# Patient Record
Sex: MALE | Race: BLACK OR AFRICAN AMERICAN | Employment: OTHER | RURAL
[De-identification: names, ages, dates, MRNs, and addresses within clinical notes are randomized per-mention and may not be internally consistent; named-entity substitution may affect disease eponyms.]

---

## 2017-01-30 ENCOUNTER — OFFICE VISIT (OUTPATIENT)
Dept: INTERNAL MEDICINE CLINIC | Age: 72
End: 2017-01-30

## 2017-01-30 VITALS
WEIGHT: 234 LBS | SYSTOLIC BLOOD PRESSURE: 113 MMHG | DIASTOLIC BLOOD PRESSURE: 63 MMHG | RESPIRATION RATE: 18 BRPM | OXYGEN SATURATION: 98 % | BODY MASS INDEX: 37.61 KG/M2 | HEART RATE: 60 BPM | HEIGHT: 66 IN | TEMPERATURE: 95.6 F

## 2017-01-30 DIAGNOSIS — C61 PROSTATE CANCER (HCC): ICD-10-CM

## 2017-01-30 DIAGNOSIS — M10.00 IDIOPATHIC GOUT, UNSPECIFIED CHRONICITY, UNSPECIFIED SITE: ICD-10-CM

## 2017-01-30 DIAGNOSIS — I10 ESSENTIAL HYPERTENSION: ICD-10-CM

## 2017-01-30 DIAGNOSIS — E11.65 TYPE 2 DIABETES MELLITUS WITH HYPERGLYCEMIA, WITHOUT LONG-TERM CURRENT USE OF INSULIN (HCC): Primary | ICD-10-CM

## 2017-01-30 RX ORDER — METFORMIN HYDROCHLORIDE 500 MG/1
TABLET ORAL
Qty: 180 TAB | Refills: 3 | Status: SHIPPED | OUTPATIENT
Start: 2017-01-30 | End: 2018-01-16 | Stop reason: SDUPTHER

## 2017-01-30 RX ORDER — HYDROCHLOROTHIAZIDE 25 MG/1
25 TABLET ORAL DAILY
Qty: 90 TAB | Refills: 3 | Status: SHIPPED | OUTPATIENT
Start: 2017-01-30 | End: 2018-01-16 | Stop reason: SDUPTHER

## 2017-01-30 RX ORDER — LISINOPRIL 10 MG/1
10 TABLET ORAL DAILY
Qty: 30 TAB | Refills: 5 | Status: SHIPPED | OUTPATIENT
Start: 2017-01-30 | End: 2018-04-18 | Stop reason: ALTCHOICE

## 2017-01-30 NOTE — MR AVS SNAPSHOT
Visit Information Date & Time Provider Department Dept. Phone Encounter #  
 1/30/2017  8:00 AM Jayro Navas MD [facility] [phone] Follow-up Instructions Return in about 3 months (around 4/30/2017) for routine follow up. Upcoming Health Maintenance Date Due MICROALBUMIN Q1 9/29/2015 EYE EXAM RETINAL OR DILATED Q1 12/1/2015 GLAUCOMA SCREENING Q2Y 12/1/2016 HEMOGLOBIN A1C Q6M 4/3/2017 FOOT EXAM Q1 6/14/2017 MEDICARE YEARLY EXAM 10/1/2017 LIPID PANEL Q1 10/3/2017 COLONOSCOPY 1/9/2025 DTaP/Tdap/Td series (2 - Td) 9/30/2026 Allergies as of 1/30/2017  Review Complete On: 1/30/2017 By: Jayro Navas MD  
  
 Severity Noted Reaction Type Reactions Lipitor [Atorvastatin]  10/23/2013    Itching Current Immunizations  Reviewed on 10/23/2013 Name Date Influenza High Dose Vaccine PF 9/30/2016 Influenza Vaccine 10/23/2013 Influenza Vaccine Ti [place]) 9/29/2014 Influenza Vaccine (Quad) PF 10/28/2015 Pneumococcal Conjugate (PCV-13) 9/30/2016 Pneumococcal Polysaccharide (PPSV-23) 9/29/2014 Not reviewed this visit You Were Diagnosed With   
  
 Codes Comments Type 2 diabetes mellitus with hyperglycemia, without long-term current use of insulin (HCC)    -  Primary ICD-10-CM: E11.65 ICD-9-CM: 250.00, 790.29 Essential hypertension     ICD-10-CM: I10 
ICD-9-CM: 401.9 Prostate cancer [facility])     ICD-10-CM: Z82 ICD-9-CM: 218 Idiopathic gout, unspecified chronicity, unspecified site     ICD-10-CM: M10.00 ICD-9-CM: 274.9 Vitals BP Pulse Temp Resp Height(growth percentile) Weight(growth percentile) 113/63 (BP 1 Location: Left arm, BP Patient Position: Sitting) 60 95.6 °F (35.3 °C) (Oral) 18 5' 6\" (1.676 m) 234 lb (106.1 kg) SpO2 BMI Smoking Status 98% 37.77 kg/m2 Never Smoker BMI and BSA Data  Body Mass Index Body Surface Area  
 37.77 kg/m 2 2.22 m 2  
  
  
 Preferred Pharmacy Pharmacy Name Phone [facility] [phone] Your Updated Medication List  
  
   
This list is accurate as of: 1/30/17  8:41 AM.  Always use your most recent med list.  
  
  
  
  
 colchicine 0.6 mg tablet Commonly known as:  COLCRYS Take 1 Tab by mouth daily. Indications: GOUT  
  
 hydroCHLOROthiazide 25 mg tablet Commonly known as:  HYDRODIURIL Take 1 Tab by mouth daily. lisinopril 10 mg tablet Commonly known as:  Jaida Primmer Take 1 Tab by mouth daily. metFORMIN 500 mg tablet Commonly known as:  GLUCOPHAGE  
TAKE 1 TABLET BY MOUTH TWO (2) TIMES DAILY (WITH MEALS). metoprolol succinate 100 mg tablet Commonly known as:  TOPROL-XL Take 1 Tab by mouth daily. naproxen 500 mg tablet Commonly known as:  NAPROSYN  
TAKE 1 TABLET BY MOUTH TWO (2) TIMES DAILY (WITH MEALS). AS NEEDED FOR GOUT  
  
 red yeast rice extract 600 mg Cap Take 600 mg by mouth now. Prescriptions Sent to Pharmacy Refills  
 hydroCHLOROthiazide (HYDRODIURIL) 25 mg tablet 3 Sig: Take 1 Tab by mouth daily. Class: Normal  
 Pharmacy: [address] Ph #: [phone] Route: Oral  
 metFORMIN (GLUCOPHAGE) 500 mg tablet 3 Sig: TAKE 1 TABLET BY MOUTH TWO (2) TIMES DAILY (WITH MEALS). Class: Normal  
 Pharmacy: [address] Ph #: [phone]  
 lisinopril (PRINIVIL, ZESTRIL) 10 mg tablet 5 Sig: Take 1 Tab by mouth daily. Class: Normal  
 Pharmacy: [address] Ph #: [phone] Route: Oral  
  
We Performed the Following HEMOGLOBIN A1C WITH EAG [15829 CPT(R)] METABOLIC PANEL, BASIC [13909 CPT(R)] MICROALBUMIN, UR, RAND W/ MICROALBUMIN/CREA RATIO U398199 CPT(R)] Follow-up Instructions Return in about 3 months (around 4/30/2017) for routine follow up. Patient Instructions Take ditropan every other day for 2 weeks before stopping. Introducing [facility] SERVICES! Shorty Christopher introduces [employer] patient portal. Now you can access parts of your medical record, email your doctor's office, and request medication refills online. 1. In your internet browser, go to [email or web address] 2. Click on the First Time User? Click Here link in the Sign In box. You will see the New Member Sign Up page. 3. Enter your [employer] Access Code exactly as it appears below. You will not need to use this code after youve completed the sign-up process. If you do not sign up before the expiration date, you must request a new code. · [employer] Access Code: [email or web address] Expires: 4/30/2017  8:14 AM 
 
4. Enter the last four digits of your Social Security Number (xxxx) and Date of Birth (mm/dd/yyyy) as indicated and click Submit. You will be taken to the next sign-up page. 5. Create a [employer] ID. This will be your [employer] login ID and cannot be changed, so think of one that is secure and easy to remember. 6. Create a [employer] password. You can change your password at any time. 7. Enter your Password Reset Question and Answer. This can be used at a later time if you forget your password. 8. Enter your e-mail address. You will receive e-mail notification when new information is available in [address] 9. Click Sign Up. You can now view and download portions of your medical record. 10. Click the Download Summary menu link to download a portable copy of your medical information. If you have questions, please visit the Frequently Asked Questions section of the [employer] website. Remember, [employer] is NOT to be used for urgent needs. For medical emergencies, dial 911. Now available from your iPhone and Android! Please provide this summary of care documentation to your next provider. Your primary care clinician is listed as Pilar Aparicio. If you have any questions after today's visit, please call [phone].

## 2017-01-30 NOTE — LETTER
2/1/2017 7:54 AM 
 
Mr. Ghada Pedroza [phone] Dear Ghada Maxwell: 
 
Please find your most recent results below. Resulted Orders MICROALBUMIN, UR, RAND W/ MICROALBUMIN/CREA RATIO Result Value Ref Range Creatinine, urine 123.8 Not Estab. mg/dL Microalbumin, urine 48.2 Not Estab. ug/mL Microalb/Creat ratio (ug/mg creat.) 38.9 (H) 0.0 - 30.0 mg/g creat Narrative Performed at:  [address]  [id number] : Mary Glasgow MD, Phone:  [phone] HEMOGLOBIN A1C WITH EAG Result Value Ref Range Hemoglobin A1c 7.4 (H) 4.8 - 5.6 % Comment:  
            Pre-diabetes: 5.7 - 6.4 Diabetes: >6.4 Glycemic control for adults with diabetes: <7.0 Estimated average glucose 166 mg/dL Narrative Performed at:  [address]  [id number] : Mary Glasgow MD, Phone:  [phone] METABOLIC PANEL, BASIC Result Value Ref Range Glucose 139 (H) 65 - 99 mg/dL BUN 17 8 - 27 mg/dL Creatinine 1.06 0.76 - 1.27 mg/dL GFR est non-AA 69 >59 mL/min/1.73 GFR est AA 80 >59 mL/min/1.73  
 BUN/Creatinine ratio 16 10 - 22 Sodium 142 134 - 144 mmol/L Potassium 3.9 3.5 - 5.2 mmol/L Chloride 99 96 - 106 mmol/L  
 CO2 28 18 - 29 mmol/L Calcium 8.9 8.6 - 10.2 mg/dL Narrative Performed at:  [address]  [id number] : Mary Glasgow MD, Phone:  [phone] RECOMMENDATIONS: 
Results of labs are normal/ stable. Follow up as scheduled. Please call me if you have any questions: [phone] Sincerely, Katie Heath MD

## 2017-01-30 NOTE — PROGRESS NOTES
Chief Complaint   Patient presents with    Hypertension     follow up     I have reviewed the patient's medical history in detail and updated the computerized patient record. Health Maintenance reviewed. 1. Have you been to the ER, urgent care clinic since your last visit? Hospitalized since your last visit?no    2. Have you seen or consulted any other health care providers outside of the [facility] since your last visit? Include any pap smears or colon screening.  no

## 2017-01-30 NOTE — PROGRESS NOTES
Subjective:     Jack Bradshaw is a 66 y.o. male seen for follow-up of diabetes. He has had hypoglycemic attacks. .no  Blood sugar control has been unknown  He has diabetes and hypertension. Saw , says prostate CA  in remission. He recommended cahnges in his prostate urine meds, noissues with urination  Captopril expensive. Not seen eye doc recently. Min c/o gout. Jack Bradshaw has the additional concern of wife has memory issues and they have some financial issues      Diabetic Review of Systems: no polyuria or polydipsia, no chest pain, dyspnea or TIA's, no numbness, tingling or pain in extremities. Current Outpatient Prescriptions   Medication Sig Dispense Refill    naproxen (NAPROSYN) 500 mg tablet TAKE 1 TABLET BY MOUTH TWO (2) TIMES DAILY (WITH MEALS). AS NEEDED FOR GOUT 60 Tab 5    captopril (CAPOTEN) 25 mg tablet TAKE 1 TABLET BY MOUTH TWO (2) TIMES A DAY FOR BLOOD PRESSURE 180 Tab 3    colchicine (COLCRYS) 0.6 mg tablet Take 1 Tab by mouth daily. Indications: GOUT 30 Tab 5    hydrochlorothiazide (HYDRODIURIL) 25 mg tablet Take 1 Tab by mouth daily. 30 Tab 5    metoprolol succinate (TOPROL-XL) 100 mg tablet Take 1 Tab by mouth daily. 30 Tab 9    metFORMIN (GLUCOPHAGE) 500 mg tablet TAKE 1 TABLET BY MOUTH TWO (2) TIMES DAILY (WITH MEALS). 60 Tab 11    red yeast rice extract 600 mg cap Take 600 mg by mouth now. Allergies   Allergen Reactions    Lipitor [Atorvastatin] Itching       Diet and Lifestyle: nonsmoker.     Patient Active Problem List    Diagnosis Date Noted    Type 2 diabetes mellitus with hyperglycemia, without long-term current use of insulin ([facility] 01/30/2017    Prostate cancer ([facility] 02/23/2016    Essential hypertension 05/28/2015    Gout 10/23/2013    Irritable bladder 10/23/2013     Social History   Substance Use Topics    Smoking status: Never Smoker    Smokeless tobacco: Current User      Comment: snuff    Alcohol use No      Comment: seldom Lab Results  Component Value Date/Time   WBC 5.1 07/07/2016 11:11 AM   HGB 11.0 07/07/2016 11:11 AM   HCT 35.6 07/07/2016 11:11 AM   PLATELET 861 71/12/9161 11:11 AM   MCV 83 07/07/2016 11:11 AM       Lab Results  Component Value Date/Time   Hemoglobin A1c 7.6 10/03/2016 12:36 PM   Hemoglobin A1c 7.2 06/14/2016 08:32 AM   Hemoglobin A1c 6.9 02/23/2016 09:10 AM   Glucose 131 10/03/2016 12:36 PM   Glucose (POC) 122 01/07/2015 08:53 AM   LDL, calculated 114 10/03/2016 12:36 PM   Creatinine 1.03 10/03/2016 12:36 PM      Lab Results  Component Value Date/Time   Cholesterol, total 210 10/03/2016 12:36 PM   HDL Cholesterol 61 10/03/2016 12:36 PM   LDL, calculated 114 10/03/2016 12:36 PM   Triglyceride 175 10/03/2016 12:36 PM       Lab Results  Component Value Date/Time   ALT 6 06/01/2015 09:55 AM   AST 11 06/01/2015 09:55 AM   Alk. phosphatase 49 06/01/2015 09:55 AM   Bilirubin, total 0.4 06/01/2015 09:55 AM       Lab Results  Component Value Date/Time   GFR est AA 84 10/03/2016 12:36 PM   GFR est non-AA 72 10/03/2016 12:36 PM   Creatinine 1.03 10/03/2016 12:36 PM   BUN 19 10/03/2016 12:36 PM   Sodium 141 10/03/2016 12:36 PM   Potassium 3.8 10/03/2016 12:36 PM   Chloride 98 10/03/2016 12:36 PM   CO2 27 10/03/2016 12:36 PM      Lab Results   Component Value Date/Time    Glucose 131 10/03/2016 12:36 PM    Glucose (POC) 122 01/07/2015 08:53 AM         Review of Systems  Pertinent items are noted in HPI. Objective:     Significant for the followingWNL    Visit Vitals    /63 (BP 1 Location: Left arm, BP Patient Position: Sitting)    Pulse 60    Temp 95.6 °F (35.3 °C) (Oral)    Resp 18    Ht 5' 6\" (1.676 m)    Wt 234 lb (106.1 kg)    SpO2 98%    BMI 37.77 kg/m2     Appearance: alert, well appearing, and in no distress.   Exam: heart sounds normal rate and regular rhythm, S1 and S2 normal, systolic murmur 2/6 at lower left sternal border, chest clear  Foot exam: deferred    Lab review: labs reviewed, I note that glycosylated hemoglobin mildly abnormal but acceptable. Assessment/Plan:     Follow-up diabetes stable. Diabetic issues reviewed with him: all medications, side effects and compliance discussed carefully, annual eye examinations at Ophthalmology discussed and glycohemoglobin and other lab monitoring discussed. ICD-10-CM ICD-9-CM    1. Type 2 diabetes mellitus with hyperglycemia, without long-term current use of insulin ([facility]) E11.65 250.00 MICROALBUMIN, UR, RAND W/ MICROALBUMIN/CREA RATIO     790.29 HEMOGLOBIN A1C WITH EAG   2. Essential hypertension R21 288.9 METABOLIC PANEL, BASIC   3. [facility] ([facility] C61 185    4. Idiopathic gout, unspecified chronicity, unspecified site M10.00 274.9      Orders Placed This Encounter    MICROALBUMIN, UR, RAND W/ MICROALBUMIN/CREA RATIO    HEMOGLOBIN A1C WITH EAG    METABOLIC PANEL, BASIC    hydroCHLOROthiazide (HYDRODIURIL) 25 mg tablet     Sig: Take 1 Tab by mouth daily. Dispense:  90 Tab     Refill:  3    metFORMIN (GLUCOPHAGE) 500 mg tablet     Sig: TAKE 1 TABLET BY MOUTH TWO (2) TIMES DAILY (WITH MEALS). Dispense:  180 Tab     Refill:  3    lisinopril (PRINIVIL, ZESTRIL) 10 mg tablet     Sig: Take 1 Tab by mouth daily. Dispense:  30 Tab     Refill:  5       Follow-up Disposition:  Return in about 3 months (around 4/30/2017) for routine follow up.

## 2017-01-31 LAB
ALBUMIN/CREAT UR: 38.9 MG/G CREAT (ref 0–30)
BUN SERPL-MCNC: 17 MG/DL (ref 8–27)
BUN/CREAT SERPL: 16 (ref 10–22)
CALCIUM SERPL-MCNC: 8.9 MG/DL (ref 8.6–10.2)
CHLORIDE SERPL-SCNC: 99 MMOL/L (ref 96–106)
CO2 SERPL-SCNC: 28 MMOL/L (ref 18–29)
CREAT SERPL-MCNC: 1.06 MG/DL (ref 0.76–1.27)
CREAT UR-MCNC: 123.8 MG/DL
EST. AVERAGE GLUCOSE BLD GHB EST-MCNC: 166 MG/DL
GLUCOSE SERPL-MCNC: 139 MG/DL (ref 65–99)
HBA1C MFR BLD: 7.4 % (ref 4.8–5.6)
MICROALBUMIN UR-MCNC: 48.2 UG/ML
POTASSIUM SERPL-SCNC: 3.9 MMOL/L (ref 3.5–5.2)
SODIUM SERPL-SCNC: 142 MMOL/L (ref 134–144)

## 2017-06-15 ENCOUNTER — OFFICE VISIT (OUTPATIENT)
Dept: INTERNAL MEDICINE CLINIC | Age: 72
End: 2017-06-15

## 2017-06-15 VITALS
BODY MASS INDEX: 36.64 KG/M2 | TEMPERATURE: 95.8 F | WEIGHT: 228 LBS | OXYGEN SATURATION: 100 % | HEART RATE: 53 BPM | SYSTOLIC BLOOD PRESSURE: 126 MMHG | RESPIRATION RATE: 16 BRPM | HEIGHT: 66 IN | DIASTOLIC BLOOD PRESSURE: 70 MMHG

## 2017-06-15 DIAGNOSIS — E11.65 TYPE 2 DIABETES MELLITUS WITH HYPERGLYCEMIA, WITHOUT LONG-TERM CURRENT USE OF INSULIN (HCC): ICD-10-CM

## 2017-06-15 DIAGNOSIS — Z13.39 SCREENING FOR ALCOHOLISM: ICD-10-CM

## 2017-06-15 DIAGNOSIS — Z00.00 ROUTINE GENERAL MEDICAL EXAMINATION AT A HEALTH CARE FACILITY: Primary | ICD-10-CM

## 2017-06-15 DIAGNOSIS — L30.9 ECZEMA, UNSPECIFIED TYPE: ICD-10-CM

## 2017-06-15 DIAGNOSIS — D64.9 ANEMIA, UNSPECIFIED TYPE: ICD-10-CM

## 2017-06-15 DIAGNOSIS — Z23 ENCOUNTER FOR IMMUNIZATION: ICD-10-CM

## 2017-06-15 DIAGNOSIS — Z13.31 SCREENING FOR DEPRESSION: ICD-10-CM

## 2017-06-15 DIAGNOSIS — C61 PROSTATE CANCER (HCC): ICD-10-CM

## 2017-06-15 DIAGNOSIS — I10 ESSENTIAL HYPERTENSION: ICD-10-CM

## 2017-06-15 DIAGNOSIS — Z13.6 SCREENING FOR ISCHEMIC HEART DISEASE: ICD-10-CM

## 2017-06-15 RX ORDER — TRIAMCINOLONE ACETONIDE 1 MG/G
CREAM TOPICAL 2 TIMES DAILY
Qty: 30 G | Refills: 1 | Status: SHIPPED | OUTPATIENT
Start: 2017-06-15 | End: 2018-04-18 | Stop reason: ALTCHOICE

## 2017-06-15 RX ORDER — CAPTOPRIL 25 MG/1
25 TABLET ORAL 2 TIMES DAILY
COMMUNITY
End: 2017-10-16 | Stop reason: ALTCHOICE

## 2017-06-15 NOTE — LETTER
6/19/2017 9:34 AM 
 
Mr. Jen NUNEZ O [phone] Dear Jen Hou: 
 
Please find your most recent results below. Resulted Orders LIPID PANEL Result Value Ref Range Cholesterol, total 197 100 - 199 mg/dL Triglyceride 95 0 - 149 mg/dL HDL Cholesterol 50 >39 mg/dL VLDL, calculated 19 5 - 40 mg/dL LDL, calculated 128 (H) 0 - 99 mg/dL Narrative Performed at:  [address]  [id number] : Ben Wbeer MD, Phone:  [phone] METABOLIC PANEL, BASIC Result Value Ref Range Glucose 122 (H) 65 - 99 mg/dL BUN 16 8 - 27 mg/dL Creatinine 1.24 0.76 - 1.27 mg/dL GFR est non-AA 57 (L) >59 mL/min/1.73 GFR est AA 66 >59 mL/min/1.73  
 BUN/Creatinine ratio 13 10 - 24 Sodium 142 134 - 144 mmol/L Potassium 3.6 3.5 - 5.2 mmol/L Chloride 99 96 - 106 mmol/L  
 CO2 26 18 - 29 mmol/L Calcium 8.8 8.6 - 10.2 mg/dL Narrative Performed at:  [address]  [id number] : Ben Weber MD, Phone:  [phone] HEMOGLOBIN A1C WITH EAG Result Value Ref Range Hemoglobin A1c 7.7 (H) 4.8 - 5.6 % Comment:  
            Pre-diabetes: 5.7 - 6.4 Diabetes: >6.4 Glycemic control for adults with diabetes: <7.0 Estimated average glucose 174 mg/dL Narrative Performed at:  [address]  [id number] : Ben Weber MD, Phone:  [phone] CBC W/O DIFF Result Value Ref Range WBC 3.9 3.4 - 10.8 x10E3/uL  
 RBC 4.35 4.14 - 5.80 x10E6/uL HGB 11.4 (L) 12.6 - 17.7 g/dL HCT 35.1 (L) 37.5 - 51.0 % MCV 81 79 - 97 fL  
 MCH 26.2 (L) 26.6 - 33.0 pg  
 MCHC 32.5 31.5 - 35.7 g/dL  
 RDW 14.5 12.3 - 15.4 % PLATELET 475 733 - 412 x10E3/uL Narrative Performed at:  [address]  [id number] : Daniel Mancia MD, Phone:  [phone] CVD REPORT Result Value Ref Range INTERPRETATION NTAP   
 PDF IMAGE Not applicable Narrative Performed at:  [address] [place] [address]  [id number] : Santa Munson PhD, Phone:  [phone] CKD REPORT Result Value Ref Range Interpretation Note Comment:  
   Supplement report is available. Narrative Performed at:  [address] [place] [address]  [id number] : Santa Munson PhD, Phone:  [phone] DIABETES PATIENT EDUCATION Result Value Ref Range PDF Image Not applicable Narrative Performed at:  [address] [place] [address]  [id number] : Santa Munson PhD, Phone:  [phone] RECOMMENDATIONS: 
The results of your most recent labs are normal/ stable. Please follow up as scheduled. Please call me if you have any questions: [phone] Sincerely, Gurinder Umanzor MD

## 2017-06-15 NOTE — PROGRESS NOTES
This is a Subsequent Medicare Annual Wellness Visit providing Personalized Prevention Plan Services (PPPS) (Performed 12 months after initial AWV and PPPS )    I have reviewed the patient's medical history in detail and updated the computerized patient record. History     Has Dm Bs OK last HgbA1c was in 7's. Prostate CA stable. He had radioactive seed implantation. Pretty much only needs yearly follow-up for that. No complaints other than his wife has a lot of dementia. He is her caretaker. Burned himself on the tractor, it is healing. Healing burn on arm    Past Medical History:   Diagnosis Date    Arthritis     Diabetes ([facility]     Gout     Hypertension       Past Surgical History:   Procedure Laterality Date    Aneita Craw  1/7/2015         HX APPENDECTOMY      burst    HX ORTHOPAEDIC      toe     Current Outpatient Prescriptions   Medication Sig Dispense Refill    captopril (CAPOTEN) 25 mg tablet Take 25 mg by mouth two (2) times a day.  hydroCHLOROthiazide (HYDRODIURIL) 25 mg tablet Take 1 Tab by mouth daily. 90 Tab 3    metFORMIN (GLUCOPHAGE) 500 mg tablet TAKE 1 TABLET BY MOUTH TWO (2) TIMES DAILY (WITH MEALS). 180 Tab 3    naproxen (NAPROSYN) 500 mg tablet TAKE 1 TABLET BY MOUTH TWO (2) TIMES DAILY (WITH MEALS). AS NEEDED FOR GOUT 60 Tab 5    colchicine (COLCRYS) 0.6 mg tablet Take 1 Tab by mouth daily. Indications: GOUT 30 Tab 5    metoprolol succinate (TOPROL-XL) 100 mg tablet Take 1 Tab by mouth daily. 30 Tab 9    red yeast rice extract 600 mg cap Take 600 mg by mouth now.  lisinopril (PRINIVIL, ZESTRIL) 10 mg tablet Take 1 Tab by mouth daily. 30 Tab 5     Allergies   Allergen Reactions    Lipitor [Atorvastatin] Itching     No family history on file.   Social History   Substance Use Topics    Smoking status: Never Smoker    Smokeless tobacco: Current User      Comment: snuff    Alcohol use No      Comment: seldom     Patient Active Problem List Diagnosis Code    Gout M10.9    Essential hypertension I10    Prostate cancer ([facility] C61    Type 2 diabetes mellitus with hyperglycemia, without long-term current use of insulin (HCC) E11.65       Depression Risk Factor Screening:     PHQ over the last two weeks 6/15/2017   Little interest or pleasure in doing things Not at all   Feeling down, depressed or hopeless Not at all   Total Score PHQ 2 0     Alcohol Risk Factor Screening: On any occasion during the past 3 months, have you had more than 4 drinks containing alcohol? No    Do you average more than 14 drinks per week? No      Functional Ability and Level of Safety:     Hearing Loss   mild    Activities of Daily Living   Self-care. Requires assistance with: no ADLs    Fall Risk     Fall Risk Assessment, last 12 mths 6/15/2017   Able to walk? Yes   Fall in past 12 months? No     Abuse Screen   Patient is not abused    Review of Systems   Pertinent items are noted in HPI. Physical Examination     Evaluation of Cognitive Function:  Mood/affect:  neutral  Appearance: age appropriate  Family member/caregiver input: NA    Visit Vitals    /70 (BP 1 Location: Left arm, BP Patient Position: Sitting)    Pulse (!) 53    Temp 95.8 °F (35.4 °C) (Oral)    Resp 16    Ht 5' 6\" (1.676 m)    Wt 228 lb (103.4 kg)    SpO2 100%    BMI 36.8 kg/m2     WD WN male NAD  Heart RRR without murmers clicks or rubs  Lungs CTA  Abdo soft nontender  Ext no edema  Diabetic foot exam was performed. No obvious sores or red lesions. Sensation checked by monofilament exam which was normal.  Dorsalis pedis pulse waspresent. Healing burn on arm. No cellulitis    Patient Care Team:  Radha Reeves MD as PCP - [facility])    Advice/Referrals/Counseling   Education and counseling provided:  Medical nutrition therapy for individuals with diabetes or renal disease      Assessment/Plan     Encounter Diagnoses   Name Primary?     Type 2 diabetes mellitus with hyperglycemia, without long-term current use of insulin (HCC) Yes    Routine general medical examination at a health care facility     Screening for alcoholism     Encounter for immunization     Screening for depression     Screening for ischemic heart disease     Essential hypertension     Prostate cancer ([facility]     Anemia, unspecified type     Wound abscess, sequela     Eczema, unspecified type      Orders Placed This Encounter    Depression Screen Annual    TETANUS, DIPHTHERIA TOXOIDS AND ACELLULAR PERTUSSIS VACCINE (TDAP), IN INDIVIDS. >=7, IM    Lipid Panel ([id number])    METABOLIC PANEL, BASIC    HEMOGLOBIN A1C WITH EAG    CBC W/O DIFF    REFERRAL TO OPHTHALMOLOGY    captopril (CAPOTEN) 25 mg tablet    triamcinolone acetonide (KENALOG) 0.1 % topical cream   .  We discussed a screening exam Dmeye exam and the  the pros and cons of having the test done. The patient made a decision to do the test: yes he will schedule. Follow-up Disposition:  Return in about 4 months (around 10/15/2017) for routine follow up.

## 2017-06-15 NOTE — PATIENT INSTRUCTIONS
Medicare Wellness Visit, Male    The best way to live healthy is to have a healthy lifestyle by eating a well-balanced diet, exercising regularly, limiting alcohol and stopping smoking. Regular physical exams and screening tests are another way to keep healthy. Preventive exams provided by your health care provider can find health problems before they become diseases or illnesses. Preventive services including immunizations, screening tests, monitoring and exams can help you take care of your own health. All people over age 72 should have a pneumovax  and and a prevnar shot to prevent pneumonia. These are once in a lifetime unless you and your provider decide differently. All people over 65 should have a yearly flu shot and a tetanus vaccine every 10 years. Screening for diabetes mellitus with a blood sugar test should be done every year. Glaucoma is a disease of the eye due to increased ocular pressure that can lead to blindness and it should be done every year by an eye professional.    Cardiovascular screening tests that check for elevated lipids (fatty part of blood) which can lead to heart disease and strokes should be done every 5 years. Colorectal screening that evaluates for blood or polyps in your colon should be done yearly as a stool test or every five years as a flexible sigmoidoscope or every 10 years as a colonoscopy up to age 76. Men up to age 76 may need a screening blood test for prostate cancer at certain intervals, depending on their personal and family history. This decision is between the patient and his provider. If you have been a smoker or had family history of abdominal aortic aneurysms, you and your provider may decide to schedule an ultrasound test of your aorta. Hepatitis C screening is also recommended for anyone born between [birth year] through [address]. A shingles vaccine is also recommended once in a lifetime after age 61.     Your Medicare Wellness Exam is recommended annually. Here is a list of your current Health Maintenance items with a due date:  Health Maintenance Due   Topic Date Due    Eye Exam  2015    Glaucoma Screening   2016    Diabetic Foot Care  2017          Tdap (Tetanus, Diphtheria, Pertussis) Vaccine: What You Need to Know  Why get vaccinated? Tetanus, diphtheria, and pertussis are very serious diseases. Tdap vaccine can protect us from these diseases. And Tdap vaccine given to pregnant women can protect  babies against pertussis. Tetanus (lockjaw) is rare in the [facility] today. It causes painful muscle tightening and stiffness, usually all over the body. · It can lead to tightening of muscles in the head and neck so you can't open your mouth, swallow, or sometimes even breathe. Tetanus kills about 1 out of 10 people who are infected even after receiving the best medical care. Diphtheria is also rare in the [place] today. It can cause a thick coating to form in the back of the throat. · It can lead to breathing problems, heart failure, paralysis, and death. Pertussis (whooping cough) causes severe coughing spells, which can cause difficulty breathing, vomiting, and disturbed sleep. · It can also lead to weight loss, incontinence, and rib fractures. Up to 2 in 100 adolescents and 5 in 100 adults with pertussis are hospitalized or have complications, which could include pneumonia or death. These diseases are caused by bacteria. Diphtheria and pertussis are spread from person to person through secretions from coughing or sneezing. Tetanus enters the body through cuts, scratches, or wounds. Before vaccines, as many as 200,000 cases of diphtheria, 200,000 cases of pertussis, and hundreds of cases of tetanus were reported in the [place] each year. Since vaccination began, reports of cases for tetanus and diphtheria have dropped by about 99% and for pertussis by about 80%.   Tdap vaccine  The Tdap vaccine can protect adolescents and adults from tetanus, diphtheria, and pertussis. One dose of Tdap is routinely given at age 6 or 15. People who did not get Tdap at that age should get it as soon as possible. Tdap is especially important for health care professionals and anyone having close contact with a baby younger than 12 months. Pregnant women should get a dose of Tdap during every pregnancy, to protect the  from pertussis. Infants are most at risk for severe, life-threatening complications from pertussis. Another vaccine, called Td, protects against tetanus and diphtheria, but not pertussis. A Td booster should be given every 10 years. Tdap may be given as one of these boosters if you have never gotten Tdap before. Tdap may also be given after a severe cut or burn to prevent tetanus infection. Your doctor or the person giving you the vaccine can give you more information. Tdap may safely be given at the same time as other vaccines. Some people should not get this vaccine  · A person who has ever had a life-threatening allergic reaction after a previous dose of any diphtheria-, tetanus-, or pertussis-containing vaccine, OR has a severe allergy to any part of this vaccine, should not get Tdap vaccine. Tell the person giving the vaccine about any severe allergies. · Anyone who had coma or long repeated seizures within 7 days after a childhood dose of DTP or DTaP, or a previous dose of Tdap, should not get Tdap, unless a cause other than the vaccine was found. They can still get Td. · Talk to your doctor if you:  ¨ Have seizures or another nervous system problem. ¨ Had severe pain or swelling after any vaccine containing diphtheria, tetanus, or pertussis. ¨ Ever had a condition called Guillain-Barré Syndrome (GBS). ¨ Aren't feeling well on the day the shot is scheduled. Risks  With any medicine, including vaccines, there is a chance of side effects.  These are usually mild and go away on their own. Serious reactions are also possible but are rare. Most people who get Tdap vaccine do not have any problems with it. Mild problems following Tdap  (Did not interfere with activities)  · Pain where the shot was given (about 3 in 4 adolescents or 2 in 3 adults)  · Redness or swelling where the shot was given (about 1 person in 5)  · Mild fever of at least 100.4°F (up to about 1 in 25 adolescents or 1 in 100 adults)  · Headache (about 3 or 4 people in 10)  · Tiredness (about 1 person in 3 or 4)  · Nausea, vomiting, diarrhea, stomachache (up to 1 in 4 adolescents or 1 in 10 adults)  · Chills, sore joints (about 1 person in 10)  · Body aches (about 1 person in 3 or 4)  · Rash, swollen glands (uncommon)  Moderate problems following Tdap  (Interfered with activities, but did not require medical attention)  · Pain where the shot was given (up to 1 in 5 or 6)  · Redness or swelling where the shot was given (up to about 1 in 16 adolescents or 1 in 12 adults)  · Fever over 102°F (about 1 in 100 adolescents or 1 in 250 adults)  · Headache (about 1 in 7 adolescents or 1 in 10 adults)  · Nausea, vomiting, diarrhea, stomachache (up to 1 to 3 people in 100)  · Swelling of the entire arm where the shot was given (up to about 1 in 500)  Severe problems following Tdap  (Unable to perform usual activities; required medical attention)  · Swelling, severe pain, bleeding and redness in the arm where the shot was given (rare)  Problems that could happen after any vaccine:  · People sometimes faint after a medical procedure, including vaccination. Sitting or lying down for about 15 minutes can help prevent fainting, and injuries caused by a fall. Tell your doctor if you feel dizzy or have vision changes or ringing in the ears. · Some people get severe pain in the shoulder and have difficulty moving the arm where a shot was given. This happens very rarely. · Any medication can cause a severe allergic reaction.  Such reactions from a vaccine are very rare, estimated at fewer than 1 in a million doses, and would happen within a few minutes to a few hours after the vaccination. As with any medicine, there is a very remote chance of a vaccine causing a serious injury or death. The safety of vaccines is always being monitored. For more information, visit: www.cdc.gov/vaccinesafety. What if there is a serious problem? What should I look for? · Look for anything that concerns you, such as signs of a severe allergic reaction, very high fever, or unusual behavior. Signs of a severe allergic reaction can include hives, swelling of the face and throat, difficulty breathing, a fast heartbeat, dizziness, and weakness. These would usually start a few minutes to a few hours after the vaccination. What should I do? · If you think it is a severe allergic reaction or other emergency that can't wait, call 9-1-1 or get the person to the nearest hospital. Otherwise, call your doctor. · Afterward, the reaction should be reported to the Vaccine Adverse Event Reporting System (VAERS). Your doctor might file this report, or you can do it yourself through the VAERS web site at www.vaers. [facility].gov, or by calling 6-288.274.6859. [employer] does not give medical advice. The National Vaccine Injury Compensation Program  The National Vaccine Injury Compensation Program (VICP) is a federal program that was created to compensate people who may have been injured by certain vaccines. Persons who believe they may have been injured by a vaccine can learn about the program and about filing a claim by calling 5-661.279.8735 or visiting the [employer] website at www.[facility].gov/vaccinecompensation. There is a time limit to file a claim for compensation. How can I learn more? · Ask your doctor. He or she can give you the vaccine package insert or suggest other sources of information. · Call your local or state health department.   · Contact the Centers for Disease Control and Prevention ([facility]):  ¨ Call 2-379.554.4776 (1-800-CDC-INFO) or  ¨ Visit CDC's website at www.cdc.gov/vaccines  Vaccine Information Statement (Interim)  Tdap Vaccine  (2/24/15)  42 [address] [id number]-57  Department of Health and Human Services  Centers for Disease Control and Prevention  Many Vaccine Information Statements are available in [place] and other languages. See www.immunize.org/vis. Muchas hojas de información sobre vacunas están disponibles en español y en otros idiomas. Visite www.immunize.org/vis. Care instructions adapted under license by your healthcare professional. If you have questions about a medical condition or this instruction, always ask your healthcare professional. [place] [address] any warranty or liability for your use of this information. Diabetic Retinopathy: Care Instructions  Your Care Instructions  Diabetes can damage the small blood vessels in part of your eye. This part of the eye is called the retina. It detects light that enters the eye. Then it sends signals to your brain about what the eye sees. When this type of eye damage happens, it's called diabetic retinopathy. It can lead to poor vision and even blindness. But if you keep your blood sugar and blood pressure levels in your target range, you can help avoid or slow the damage. Follow-up care is a key part of your treatment and safety. Be sure to make and go to all appointments, and call your doctor if you are having problems. It's also a good idea to know your test results and keep a list of the medicines you take. How can you care for yourself at home? · Have regular eye exams. Tell your doctor about any changes in your vision. · Keep blood sugar in a target range. ¨ Eat a variety of healthy foods, and spread carbohydrate throughout the day. You may want to have a dietitian help you plan meals. ¨ If your doctor recommends it, get more exercise. Walking is a good choice.  Bit by bit, increase the amount you walk every day. Try for at least 30 minutes on most days of the week. ¨ Be safe with medicines. Take your medicine exactly as prescribed. Call your doctor if you think you are having a problem with your medicine. ¨ Check your blood sugar as often as your doctor recommends. · Eat a low-salt diet. This may help keep your blood pressure at a normal level. You may also need to take medicines to reach your goals. · Do not smoke. Smoking can make this condition worse. If you need help quitting, talk to your doctor about stop-smoking programs and medicines. These can increase your chances of quitting for good. When should you call for help? Call your doctor now or seek immediate medical care if:  · You have new changes in vision in either eye. Watch closely for changes in your health, and be sure to contact your doctor if:  · Your vision is getting worse. Where can you learn more? Go to http://parth-sean.info/. Enter R396 in the search box to learn more about \"Diabetic Retinopathy: Care Instructions. \"  Current as of: May 23, 2016  Content Version: 11.2  © 8785-1966 [employer]. Care instructions adapted under license by [employer] (which disclaims liability or warranty for this information). If you have questions about a medical condition or this instruction, always ask your healthcare professional. [address] any warranty or liability for your use of this information.

## 2017-06-15 NOTE — MR AVS SNAPSHOT
Visit Information Date & Time Provider Department Dept. Phone Encounter #  
 6/15/2017  7:30 AM Dalia Anderson  [place]  [id number] Follow-up Instructions Return in about 4 months (around 10/15/2017) for routine follow up. Upcoming Health Maintenance Date Due  
 EYE EXAM RETINAL OR DILATED Q1 12/1/2015 GLAUCOMA SCREENING Q2Y 12/1/2016 HEMOGLOBIN A1C Q6M 7/30/2017 INFLUENZA AGE 9 TO ADULT 8/1/2017 MEDICARE YEARLY EXAM 10/1/2017 LIPID PANEL Q1 10/3/2017 MICROALBUMIN Q1 1/30/2018 FOOT EXAM Q1 6/15/2018 COLONOSCOPY 1/9/2025 DTaP/Tdap/Td series (2 - Td) 9/30/2026 Allergies as of 6/15/2017  Review Complete On: 6/15/2017 By: Dalia Anderson MD  
  
 Severity Noted Reaction Type Reactions Lipitor [Atorvastatin]  10/23/2013    Itching Current Immunizations  Reviewed on 10/23/2013 Name Date Influenza High Dose Vaccine PF 9/30/2016 Influenza Vaccine 10/23/2013 Influenza Vaccine Waucoma De) 9/29/2014 Influenza Vaccine (Quad) PF 10/28/2015 Pneumococcal Conjugate (PCV-13) 9/30/2016 Pneumococcal Polysaccharide (PPSV-23) 9/29/2014 Tdap  Incomplete Not reviewed this visit You Were Diagnosed With   
  
 Codes Comments Type 2 diabetes mellitus with hyperglycemia, without long-term current use of insulin (HCC)    -  Primary ICD-10-CM: E11.65 ICD-9-CM: 250.00, 790.29 Routine general medical examination at a health care facility     ICD-10-CM: Z00.00 ICD-9-CM: V70.0 Screening for alcoholism     ICD-10-CM: Z13.89 ICD-9-CM: V79.1 Encounter for immunization     ICD-10-CM: R74 ICD-9-CM: V03.89 Screening for depression     ICD-10-CM: Z13.89 ICD-9-CM: V79.0 Screening for ischemic heart disease     ICD-10-CM: Z13.6 ICD-9-CM: V81.0 Essential hypertension     ICD-10-CM: I10 
ICD-9-CM: 401.9 Prostate cancer [facility])     ICD-10-CM: H07 ICD-9-CM: 455 Anemia, unspecified type     ICD-10-CM: D64.9 ICD-9-CM: 285.9 Wound abscess, sequela     ICD-10-CM: T81. 4XXS ICD-9-CM: 909.3 Eczema, unspecified type     ICD-10-CM: L30.9 ICD-9-CM: 692.9 Vitals BP Pulse Temp Resp Height(growth percentile) Weight(growth percentile) 126/70 (BP 1 Location: Left arm, BP Patient Position: Sitting) (!) 53 95.8 °F (35.4 °C) (Oral) 16 5' 6\" (1.676 m) 228 lb (103.4 kg) SpO2 BMI Smoking Status 100% 36.8 kg/m2 Never Smoker Vitals History BMI and BSA Data Body Mass Index Body Surface Area  
 36.8 kg/m 2 2.19 m 2 Preferred Pharmacy Pharmacy Name Phone [facility] [address] [phone] Your Updated Medication List  
  
   
This list is accurate as of: 6/15/17  8:18 AM.  Always use your most recent med list.  
  
  
  
  
 captopril 25 mg tablet Commonly known as:  CAPOTEN Take 25 mg by mouth two (2) times a day. colchicine 0.6 mg tablet Commonly known as:  COLCRYS Take 1 Tab by mouth daily. Indications: GOUT  
  
 hydroCHLOROthiazide 25 mg tablet Commonly known as:  HYDRODIURIL Take 1 Tab by mouth daily. lisinopril 10 mg tablet Commonly known as:  Simone Leaver Take 1 Tab by mouth daily. metFORMIN 500 mg tablet Commonly known as:  GLUCOPHAGE  
TAKE 1 TABLET BY MOUTH TWO (2) TIMES DAILY (WITH MEALS). metoprolol succinate 100 mg tablet Commonly known as:  TOPROL-XL Take 1 Tab by mouth daily. naproxen 500 mg tablet Commonly known as:  NAPROSYN  
TAKE 1 TABLET BY MOUTH TWO (2) TIMES DAILY (WITH MEALS). AS NEEDED FOR GOUT  
  
 red yeast rice extract 600 mg Cap Take 600 mg by mouth now.  
  
 triamcinolone acetonide 0.1 % topical cream  
Commonly known as:  KENALOG Apply  to affected area two (2) times a day. use thin layer Prescriptions Sent to Pharmacy Refills triamcinolone acetonide (KENALOG) 0.1 % topical cream 1 Sig: Apply  to affected area two (2) times a day. use thin layer Class: Normal  
 Pharmacy: [facility] , [facility] [facility] #: [phone] Route: Topical  
  
We Performed the Following CBC W/O DIFF [26158 CPT(R)] [address] [[id number] [facility]] HEMOGLOBIN A1C WITH EAG [05036 CPT(R)] LIPID PANEL [27476 CPT(R)] METABOLIC PANEL, BASIC [65656 CPT(R)] REFERRAL TO OPHTHALMOLOGY [REF57 Custom] Comments:  
 Please evaluate patient for Dm eye exam.  
 TETANUS, DIPHTHERIA TOXOIDS AND ACELLULAR PERTUSSIS VACCINE (TDAP), IN INDIVIDS. >=7, IM [id number] CPT(R)] Follow-up Instructions Return in about 4 months (around 10/15/2017) for routine follow up. Referral Information Referral ID Referred By Referred To  
  
 [id number] [address] [place], [address] Visits Status Start Date End Date 1 New Request 6/15/17 6/15/18 If your referral has a status of pending review or denied, additional information will be sent to support the outcome of this decision. Patient Instructions Medicare Wellness Visit, Male The best way to live healthy is to have a healthy lifestyle by eating a well-balanced diet, exercising regularly, limiting alcohol and stopping smoking. Regular physical exams and screening tests are another way to keep healthy. Preventive exams provided by your health care provider can find health problems before they become diseases or illnesses. Preventive services including immunizations, screening tests, monitoring and exams can help you take care of your own health. All people over age 72 should have a pneumovax  and and a prevnar shot to prevent pneumonia. These are once in a lifetime unless you and your provider decide differently. All people over 65 should have a yearly flu shot and a tetanus vaccine every 10 years. Screening for diabetes mellitus with a blood sugar test should be done every year. Glaucoma is a disease of the eye due to increased ocular pressure that can lead to blindness and it should be done every year by an eye professional. 
 
Cardiovascular screening tests that check for elevated lipids (fatty part of blood) which can lead to heart disease and strokes should be done every 5 years. Colorectal screening that evaluates for blood or polyps in your colon should be done yearly as a stool test or every five years as a flexible sigmoidoscope or every 10 years as a colonoscopy up to age 76. Men up to age 76 may need a screening blood test for prostate cancer at certain intervals, depending on their personal and family history. This decision is between the patient and his provider. If you have been a smoker or had family history of abdominal aortic aneurysms, you and your provider may decide to schedule an ultrasound test of your aorta. Hepatitis C screening is also recommended for anyone born between [birth year] through [address]. A shingles vaccine is also recommended once in a lifetime after age 61. Your Medicare Wellness Exam is recommended annually. Here is a list of your current Health Maintenance items with a due date: 
Health Maintenance Due Topic Date Due [facility] Eye Exam  2015  Glaucoma Screening   2016 [facility] Diabetic Foot Care  2017 Tdap (Tetanus, Diphtheria, Pertussis) Vaccine: What You Need to Know Why get vaccinated? Tetanus, diphtheria, and pertussis are very serious diseases. Tdap vaccine can protect us from these diseases. And Tdap vaccine given to pregnant women can protect  babies against pertussis. Tetanus (lockjaw) is rare in the [facility] today. It causes painful muscle tightening and stiffness, usually all over the body. · It can lead to tightening of muscles in the head and neck so you can't open your mouth, swallow, or sometimes even breathe. Tetanus kills about 1 out of 10 people who are infected even after receiving the best medical care. Diphtheria is also rare in the [place] today. It can cause a thick coating to form in the back of the throat. · It can lead to breathing problems, heart failure, paralysis, and death. Pertussis (whooping cough) causes severe coughing spells, which can cause difficulty breathing, vomiting, and disturbed sleep. · It can also lead to weight loss, incontinence, and rib fractures. Up to 2 in 100 adolescents and 5 in 100 adults with pertussis are hospitalized or have complications, which could include pneumonia or death. These diseases are caused by bacteria. Diphtheria and pertussis are spread from person to person through secretions from coughing or sneezing. Tetanus enters the body through cuts, scratches, or wounds. Before vaccines, as many as 200,000 cases of diphtheria, 200,000 cases of pertussis, and hundreds of cases of tetanus were reported in the [place] each year. Since vaccination began, reports of cases for tetanus and diphtheria have dropped by about 99% and for pertussis by about 80%. Tdap vaccine The Tdap vaccine can protect adolescents and adults from tetanus, diphtheria, and pertussis. One dose of Tdap is routinely given at age 6 or 15. People who did not get Tdap at that age should get it as soon as possible. Tdap is especially important for health care professionals and anyone having close contact with a baby younger than 12 months. Pregnant women should get a dose of Tdap during every pregnancy, to protect the  from pertussis. Infants are most at risk for severe, life-threatening complications from pertussis.  
Another vaccine, called Td, protects against tetanus and diphtheria, but not pertussis. A Td booster should be given every 10 years. Tdap may be given as one of these boosters if you have never gotten Tdap before. Tdap may also be given after a severe cut or burn to prevent tetanus infection. Your doctor or the person giving you the vaccine can give you more information. Tdap may safely be given at the same time as other vaccines. Some people should not get this vaccine · A person who has ever had a life-threatening allergic reaction after a previous dose of any diphtheria-, tetanus-, or pertussis-containing vaccine, OR has a severe allergy to any part of this vaccine, should not get Tdap vaccine. Tell the person giving the vaccine about any severe allergies. · Anyone who had coma or long repeated seizures within 7 days after a childhood dose of DTP or DTaP, or a previous dose of Tdap, should not get Tdap, unless a cause other than the vaccine was found. They can still get Td. · Talk to your doctor if you: 
¨ Have seizures or another nervous system problem. ¨ Had severe pain or swelling after any vaccine containing diphtheria, tetanus, or pertussis. ¨ Ever had a condition called Guillain-Barré Syndrome (GBS). ¨ Aren't feeling well on the day the shot is scheduled. Risks With any medicine, including vaccines, there is a chance of side effects. These are usually mild and go away on their own. Serious reactions are also possible but are rare. Most people who get Tdap vaccine do not have any problems with it. Mild problems following Tdap 
(Did not interfere with activities) · Pain where the shot was given (about 3 in 4 adolescents or 2 in 3 adults) · Redness or swelling where the shot was given (about 1 person in 5) · Mild fever of at least 100.4°F (up to about 1 in 25 adolescents or 1 in 100 adults) · Headache (about 3 or 4 people in 10) · Tiredness (about 1 person in 3 or 4) · Nausea, vomiting, diarrhea, stomachache (up to 1 in 4 adolescents or 1 in 10 adults) · Chills, sore joints (about 1 person in 10) · Body aches (about 1 person in 3 or 4) · Rash, swollen glands (uncommon) Moderate problems following Tdap (Interfered with activities, but did not require medical attention) · Pain where the shot was given (up to 1 in 5 or 6) · Redness or swelling where the shot was given (up to about 1 in 16 adolescents or 1 in 12 adults) · Fever over 102°F (about 1 in 100 adolescents or 1 in 250 adults) · Headache (about 1 in 7 adolescents or 1 in 10 adults) · Nausea, vomiting, diarrhea, stomachache (up to 1 to 3 people in 100) · Swelling of the entire arm where the shot was given (up to about 1 in 500) Severe problems following Tdap 
(Unable to perform usual activities; required medical attention) · Swelling, severe pain, bleeding and redness in the arm where the shot was given (rare) Problems that could happen after any vaccine: · People sometimes faint after a medical procedure, including vaccination. Sitting or lying down for about 15 minutes can help prevent fainting, and injuries caused by a fall. Tell your doctor if you feel dizzy or have vision changes or ringing in the ears. · Some people get severe pain in the shoulder and have difficulty moving the arm where a shot was given. This happens very rarely. · Any medication can cause a severe allergic reaction. Such reactions from a vaccine are very rare, estimated at fewer than 1 in a million doses, and would happen within a few minutes to a few hours after the vaccination. As with any medicine, there is a very remote chance of a vaccine causing a serious injury or death. The safety of vaccines is always being monitored. For more information, visit: www.cdc.gov/vaccinesafety. What if there is a serious problem? What should I look for? · Look for anything that concerns you, such as signs of a severe allergic reaction, very high fever, or unusual behavior. Signs of a severe allergic reaction can include hives, swelling of the face and throat, difficulty breathing, a fast heartbeat, dizziness, and weakness. These would usually start a few minutes to a few hours after the vaccination. What should I do? · If you think it is a severe allergic reaction or other emergency that can't wait, call 9-1-1 or get the person to the nearest hospital. Otherwise, call your doctor. · Afterward, the reaction should be reported to the Vaccine Adverse Event Reporting System (VAERS). Your doctor might file this report, or you can do it yourself through the VAERS web site at www.vaers. [facility].gov, or by calling 7-157.910.1336. VAERS does not give medical advice. The National Vaccine Injury Compensation Program 
The National Vaccine Injury Compensation Program (VICP) is a federal program that was created to compensate people who may have been injured by certain vaccines. Persons who believe they may have been injured by a vaccine can learn about the program and about filing a claim by calling 3-595.996.2645 or visiting the [employer] website at www.[facility].gov/vaccinecompensation. There is a time limit to file a claim for compensation. How can I learn more? · Ask your doctor. He or she can give you the vaccine package insert or suggest other sources of information. · Call your local or state health department. · Contact the Centers for Disease Control and Prevention (CDC): 
¨ Call 3-791.284.8304 (1-800-CDC-INFO) or ¨ Visit CDC's website at www.cdc.gov/vaccines Vaccine Information Statement (Interim) Tdap Vaccine 
(2/24/15) 42 [address] 998XV-[id number] Department of Health and [employer] Centers for Disease Control and Prevention Many Vaccine Information Statements are available in [place] and other languages. See www.immunize.org/vis. Muchas hojas de información sobre vacunas están disponibles en español y en otros idiomas. Visite www.immunize.org/vis. Care instructions adapted under license by your healthcare professional. If you have questions about a medical condition or this instruction, always ask your healthcare professional. [address] any warranty or liability for your use of this information. Introducing [facility] SERVICES! Ezekiel Mistry introduces emids patient portal. Now you can access parts of your medical record, email your doctor's office, and request medication refills online. 1. In your internet browser, go to [email or web address] 2. Click on the First Time User? Click Here link in the Sign In box. You will see the New Member Sign Up page. 3. Enter your emids Access Code exactly as it appears below. You will not need to use this code after youve completed the sign-up process. If you do not sign up before the expiration date, you must request a new code. · emids Access Code: [email or web address] Expires: 9/13/2017  7:26 AM 
 
4. Enter the last four digits of your Social Security Number (xxxx) and Date of Birth (mm/dd/yyyy) as indicated and click Submit. You will be taken to the next sign-up page. 5. Create a emids ID. This will be your emids login ID and cannot be changed, so think of one that is secure and easy to remember. 6. Create a emids password. You can change your password at any time. 7. Enter your Password Reset Question and Answer. This can be used at a later time if you forget your password. 8. Enter your e-mail address. You will receive e-mail notification when new information is available in [address]. 9. Click Sign Up. You can now view and download portions of your medical record. 10. Click the Download Summary menu link to download a portable copy of your medical information. If you have questions, please visit the Frequently Asked Questions section of the emids website. Remember, emids is NOT to be used for urgent needs. For medical emergencies, dial 911. Now available from your iPhone and Android! Please provide this summary of care documentation to your next provider. Your primary care clinician is listed as Laverna Lennox. If you have any questions after today's visit, please call [phone].

## 2017-06-16 LAB
BUN SERPL-MCNC: 16 MG/DL (ref 8–27)
BUN/CREAT SERPL: 13 (ref 10–24)
CALCIUM SERPL-MCNC: 8.8 MG/DL (ref 8.6–10.2)
CHLORIDE SERPL-SCNC: 99 MMOL/L (ref 96–106)
CHOLEST SERPL-MCNC: 197 MG/DL (ref 100–199)
CO2 SERPL-SCNC: 26 MMOL/L (ref 18–29)
CREAT SERPL-MCNC: 1.24 MG/DL (ref 0.76–1.27)
ERYTHROCYTE [DISTWIDTH] IN BLOOD BY AUTOMATED COUNT: 14.5 % (ref 12.3–15.4)
EST. AVERAGE GLUCOSE BLD GHB EST-MCNC: 174 MG/DL
GLUCOSE SERPL-MCNC: 122 MG/DL (ref 65–99)
HBA1C MFR BLD: 7.7 % (ref 4.8–5.6)
HCT VFR BLD AUTO: 35.1 % (ref 37.5–51)
HDLC SERPL-MCNC: 50 MG/DL
HGB BLD-MCNC: 11.4 G/DL (ref 12.6–17.7)
INTERPRETATION, 910389: NORMAL
INTERPRETATION: NORMAL
LDLC SERPL CALC-MCNC: 128 MG/DL (ref 0–99)
Lab: NORMAL
MCH RBC QN AUTO: 26.2 PG (ref 26.6–33)
MCHC RBC AUTO-ENTMCNC: 32.5 G/DL (ref 31.5–35.7)
MCV RBC AUTO: 81 FL (ref 79–97)
PDF IMAGE, 910387: NORMAL
PLATELET # BLD AUTO: 198 X10E3/UL (ref 150–379)
POTASSIUM SERPL-SCNC: 3.6 MMOL/L (ref 3.5–5.2)
RBC # BLD AUTO: 4.35 X10E6/UL (ref 4.14–5.8)
SODIUM SERPL-SCNC: 142 MMOL/L (ref 134–144)
TRIGL SERPL-MCNC: 95 MG/DL (ref 0–149)
VLDLC SERPL CALC-MCNC: 19 MG/DL (ref 5–40)
WBC # BLD AUTO: 3.9 X10E3/UL (ref 3.4–10.8)

## 2017-07-10 RX ORDER — METOPROLOL SUCCINATE 100 MG/1
TABLET, EXTENDED RELEASE ORAL
Qty: 90 TAB | Refills: 1 | Status: SHIPPED | OUTPATIENT
Start: 2017-07-10 | End: 2017-10-16 | Stop reason: SDUPTHER

## 2017-07-10 NOTE — TELEPHONE ENCOUNTER
Requested Prescriptions     Pending Prescriptions Disp Refills    metoprolol succinate (TOPROL-XL) 100 mg tablet [Pharmacy Med Name: METOPROLOL 100MG ER] 90 Tab 1     Sig: TAKE 1 TABLET BY MOUTH DAILY FOR HEART & BLOOD PRESSURE     Last office visit 6/15/17  FUTURE 10/16/17  Last filled  6/14/16  Changes made to medication on last visit NONE

## 2017-07-25 RX ORDER — CAPTOPRIL 25 MG/1
TABLET ORAL
Qty: 180 TAB | Refills: 1 | Status: SHIPPED | OUTPATIENT
Start: 2017-07-25 | End: 2018-01-16 | Stop reason: SDUPTHER

## 2017-07-25 NOTE — TELEPHONE ENCOUNTER
Requested Prescriptions     Pending Prescriptions Disp Refills    captopril (CAPOTEN) 25 mg tablet [Pharmacy Med Name: CAPTOPRIL 25MG] 180 Tab 1     Sig: TAKE 1 TABLET BY MOUTH TWO (2) TIMES A DAY FOR BLOOD PRESSURE     Last office visit 6/15/17  FUTURE 10/16/17  Last filled  7/10/17  Changes made to medication on last visit NONE

## 2017-10-11 RX ORDER — NAPROXEN 500 MG/1
TABLET ORAL
Qty: 60 TAB | Refills: 5 | Status: SHIPPED | OUTPATIENT
Start: 2017-10-11 | End: 2019-03-16 | Stop reason: SDUPTHER

## 2017-10-16 ENCOUNTER — OFFICE VISIT (OUTPATIENT)
Dept: INTERNAL MEDICINE CLINIC | Age: 72
End: 2017-10-16

## 2017-10-16 VITALS
HEART RATE: 61 BPM | DIASTOLIC BLOOD PRESSURE: 74 MMHG | OXYGEN SATURATION: 98 % | BODY MASS INDEX: 36.96 KG/M2 | WEIGHT: 230 LBS | SYSTOLIC BLOOD PRESSURE: 132 MMHG | TEMPERATURE: 95.7 F | RESPIRATION RATE: 16 BRPM | HEIGHT: 66 IN

## 2017-10-16 DIAGNOSIS — M10.00 IDIOPATHIC GOUT, UNSPECIFIED CHRONICITY, UNSPECIFIED SITE: ICD-10-CM

## 2017-10-16 DIAGNOSIS — Z23 ENCOUNTER FOR IMMUNIZATION: ICD-10-CM

## 2017-10-16 DIAGNOSIS — I10 ESSENTIAL HYPERTENSION: ICD-10-CM

## 2017-10-16 DIAGNOSIS — E11.65 TYPE 2 DIABETES MELLITUS WITH HYPERGLYCEMIA, WITHOUT LONG-TERM CURRENT USE OF INSULIN (HCC): Primary | ICD-10-CM

## 2017-10-16 LAB — HBA1C MFR BLD HPLC: 7.4 %

## 2017-10-16 RX ORDER — METOPROLOL SUCCINATE 100 MG/1
TABLET, EXTENDED RELEASE ORAL
Qty: 90 TAB | Refills: 3 | Status: SHIPPED | OUTPATIENT
Start: 2017-10-16 | End: 2018-09-28 | Stop reason: SDUPTHER

## 2017-10-16 NOTE — PROGRESS NOTES
Subjective:     Luis Herzog is a 74 y.o. male seen for follow-up of diabetes. He has had hypoglycemic attacks. .no  Blood sugar control has been OK as far he knows  He has diabetes, hypertension and hyperlipidemia. Luis Herzog has the additional concern of none wife in [address] after breaking hip had surgery. She has some dementia. Currently in rehab facility and is walking some. Diabetic Review of Systems: no polyuria or polydipsia, no chest pain, dyspnea or TIA's, no numbness, tingling or pain in extremities. Allergies   Allergen Reactions    Lipitor [Atorvastatin] Itching       Diet and Lifestyle: nonsmoker. Patient Active Problem List    Diagnosis Date Noted    Type 2 diabetes mellitus with hyperglycemia, without long-term current use of insulin ([facility] 01/30/2017    Prostate cancer ([facility] 02/23/2016    Essential hypertension 05/28/2015    Gout 10/23/2013     Current Outpatient Prescriptions   Medication Sig Dispense Refill    naproxen (NAPROSYN) 500 mg tablet TAKE 1 TABLET BY MOUTH TWO (2) TIMES DAILY (WITH MEALS). AS NEEDED FOR GOUT 60 Tab 5    captopril (CAPOTEN) 25 mg tablet TAKE 1 TABLET BY MOUTH TWO (2) TIMES A DAY FOR BLOOD PRESSURE 180 Tab 1    metoprolol succinate (TOPROL-XL) 100 mg tablet TAKE 1 TABLET BY MOUTH DAILY FOR HEART & BLOOD PRESSURE 90 Tab 1    triamcinolone acetonide (KENALOG) 0.1 % topical cream Apply  to affected area two (2) times a day. use thin layer 30 g 1    hydroCHLOROthiazide (HYDRODIURIL) 25 mg tablet Take 1 Tab by mouth daily. 90 Tab 3    metFORMIN (GLUCOPHAGE) 500 mg tablet TAKE 1 TABLET BY MOUTH TWO (2) TIMES DAILY (WITH MEALS). 180 Tab 3    lisinopril (PRINIVIL, ZESTRIL) 10 mg tablet Take 1 Tab by mouth daily. 30 Tab 5    colchicine (COLCRYS) 0.6 mg tablet Take 1 Tab by mouth daily. Indications: GOUT 30 Tab 5    red yeast rice extract 600 mg cap Take 600 mg by mouth now.        Allergies   Allergen Reactions    Lipitor [Atorvastatin] Itching        Lab Results  Component Value Date/Time   WBC 3.9 06/15/2017 08:00 AM   HGB 11.4 06/15/2017 08:00 AM   HCT 35.1 06/15/2017 08:00 AM   PLATELET 945 50/34/1728 08:00 AM   MCV 81 06/15/2017 08:00 AM     Lab Results  Component Value Date/Time   Hemoglobin A1c 7.7 06/15/2017 08:00 AM   Hemoglobin A1c 7.4 01/30/2017 08:33 AM   Hemoglobin A1c 7.6 10/03/2016 12:36 PM   Glucose 122 06/15/2017 08:00 AM   Glucose (POC) 122 01/07/2015 08:53 AM   Microalb/Creat ratio (ug/mg creat.) 38.9 01/30/2017 08:24 AM   LDL, calculated 128 06/15/2017 08:00 AM   Creatinine 1.24 06/15/2017 08:00 AM      Lab Results  Component Value Date/Time   Cholesterol, total 197 06/15/2017 08:00 AM   HDL Cholesterol 50 06/15/2017 08:00 AM   LDL, calculated 128 06/15/2017 08:00 AM   Triglyceride 95 06/15/2017 08:00 AM   Lab Results  Component Value Date/Time   ALT (SGPT) 6 06/01/2015 09:55 AM   AST (SGOT) 11 06/01/2015 09:55 AM   Alk. phosphatase 49 06/01/2015 09:55 AM   Bilirubin, total 0.4 06/01/2015 09:55 AM   Albumin 4.3 06/01/2015 09:55 AM   Protein, total 6.7 06/01/2015 09:55 AM   PLATELET 677 32/84/0105 08:00 AM       Lab Results  Component Value Date/Time   GFR est non-AA 57 06/15/2017 08:00 AM   GFR est AA 66 06/15/2017 08:00 AM   Creatinine 1.24 06/15/2017 08:00 AM   BUN 16 06/15/2017 08:00 AM   Sodium 142 06/15/2017 08:00 AM   Potassium 3.6 06/15/2017 08:00 AM   Chloride 99 06/15/2017 08:00 AM   CO2 26 06/15/2017 08:00 AM     Lab Results   Component Value Date/Time    Glucose 122 06/15/2017 08:00 AM    Glucose (POC) 122 01/07/2015 08:53 AM                         Review of Systems  Pertinent items are noted in HPI. Min gout occa uses Naprosyn    Objective:     Significant for the followingWNL     Visit Vitals    /74    Pulse 61    Temp 95.7 °F (35.4 °C) (Oral)    Resp 16    Ht 5' 6\" (1.676 m)    Wt 230 lb (104.3 kg)    SpO2 98%    BMI 37.12 kg/m2     Appearance: alert, well appearing, and in no distress.   Exam: heart sounds normal rate, regular rhythm, normal S1, S2, no murmurs, rubs, clicks or gallops, chest clear  Foot exam: deferred    Lab review: orders written for new lab studies as appropriate; see orders. Good A1C 7.4 was 7.7    Assessment/Plan:     Follow-up diabetes stable. Diabetic issues reviewed with him: all medications, side effects and compliance discussed carefully, annual eye examinations at Ophthalmology discussed and glycohemoglobin and other lab monitoring discussed. Gout stable  HTN stable. Good BP      ICD-10-CM ICD-9-CM    1. Type 2 diabetes mellitus with hyperglycemia, without long-term current use of insulin (HCC) E11.65 250.00 AMB POC HEMOGLOBIN A1C     790.29    2. Essential hypertension I10 401.9    3. Idiopathic gout, unspecified chronicity, unspecified site M10.00 274.9    4. Encounter for immunization Z23 V03.89 INFLUENZA VIRUS VACCINE, HIGH DOSE SEASONAL, PRESERVATIVE FREE     Orders Placed This Encounter    Influenza virus vaccine (FLUZONE HIGH-DOSE) 65 years and older (26767)    AMB POC HEMOGLOBIN A1C    metoprolol succinate (TOPROL-XL) 100 mg tablet     Sig: TAKE 1 TABLET BY MOUTH DAILY FOR HEART & BLOOD PRESSURE     Dispense:  90 Tab     Refill:  3     Generic For:TOPROL XL  100MG       Chronic Conditions Addressed Today     1. Type 2 diabetes mellitus with hyperglycemia, without long-term current use of insulin (HCC) - Primary     Relevant Orders     AMB POC HEMOGLOBIN A1C (Completed)    2. Gout    3. Essential hypertension     Relevant Medications     metoprolol succinate (TOPROL-XL) 100 mg tablet      Acute Diagnoses Addressed Today     Encounter for immunization            Relevant Orders        INFLUENZA VIRUS VACCINE, HIGH DOSE SEASONAL, PRESERVATIVE FREE        Follow-up Disposition:  Return in about 4 months (around 2/16/2018) for routine follow up.

## 2017-10-16 NOTE — MR AVS SNAPSHOT
Visit Information Date & Time Provider Department Dept. Phone Encounter #  
 10/16/2017  8:00 AM Minna Blizzard,  [place]  [id number] Upcoming Health Maintenance Date Due  
 EYE EXAM RETINAL OR DILATED Q1 12/1/2015 GLAUCOMA SCREENING Q2Y 12/1/2016 INFLUENZA AGE 9 TO ADULT 8/1/2017 HEMOGLOBIN A1C Q6M 12/15/2017 MICROALBUMIN Q1 1/30/2018 FOOT EXAM Q1 6/15/2018 LIPID PANEL Q1 6/15/2018 MEDICARE YEARLY EXAM 6/16/2018 COLONOSCOPY 1/9/2025 DTaP/Tdap/Td series (2 - Td) 6/15/2027 Allergies as of 10/16/2017  Review Complete On: 10/16/2017 By: Minna Blizzard, MD  
  
 Severity Noted Reaction Type Reactions Lipitor [Atorvastatin]  10/23/2013    Itching Current Immunizations  Reviewed on 6/15/2017 Name Date Influenza High Dose Vaccine PF  Incomplete, 9/30/2016 Influenza Vaccine 10/23/2013 Influenza Vaccine Schroeder Shelby) 9/29/2014 Influenza Vaccine (Quad) PF 10/28/2015 Pneumococcal Conjugate (PCV-13) 9/30/2016 Pneumococcal Polysaccharide (PPSV-23) 9/29/2014 Tdap 6/15/2017 Not reviewed this visit You Were Diagnosed With   
  
 Codes Comments Type 2 diabetes mellitus with hyperglycemia, without long-term current use of insulin (HCC)    -  Primary ICD-10-CM: E11.65 ICD-9-CM: 250.00, 790.29 Essential hypertension     ICD-10-CM: I10 
ICD-9-CM: 401.9 Idiopathic gout, unspecified chronicity, unspecified site     ICD-10-CM: M10.00 ICD-9-CM: 274.9 Encounter for immunization     ICD-10-CM: M16 ICD-9-CM: V03.89 Vitals BP Pulse Temp Resp Height(growth percentile) Weight(growth percentile) 132/74 61 95.7 °F (35.4 °C) (Oral) 16 5' 6\" (1.676 m) 230 lb (104.3 kg) SpO2 BMI Smoking Status 98% 37.12 kg/m2 Never Smoker Vitals History BMI and BSA Data Body Mass Index Body Surface Area  
 37.12 kg/m 2 2.2 m 2 Preferred Pharmacy Pharmacy Name Phone [facility], [address] [phone] Your Updated Medication List  
  
   
This list is accurate as of: 10/16/17  9:00 AM.  Always use your most recent med list.  
  
  
  
  
 captopril 25 mg tablet Commonly known as:  CAPOTEN  
TAKE 1 TABLET BY MOUTH TWO (2) TIMES A DAY FOR BLOOD PRESSURE  
  
 colchicine 0.6 mg tablet Commonly known as:  COLCRYS Take 1 Tab by mouth daily. Indications: GOUT  
  
 hydroCHLOROthiazide 25 mg tablet Commonly known as:  HYDRODIURIL Take 1 Tab by mouth daily. lisinopril 10 mg tablet Commonly known as:  Chinyere Shames Take 1 Tab by mouth daily. metFORMIN 500 mg tablet Commonly known as:  GLUCOPHAGE  
TAKE 1 TABLET BY MOUTH TWO (2) TIMES DAILY (WITH MEALS). metoprolol succinate 100 mg tablet Commonly known as:  TOPROL-XL  
TAKE 1 TABLET BY MOUTH DAILY FOR HEART & BLOOD PRESSURE  
  
 naproxen 500 mg tablet Commonly known as:  NAPROSYN  
TAKE 1 TABLET BY MOUTH TWO (2) TIMES DAILY (WITH MEALS). AS NEEDED FOR GOUT  
  
 red yeast rice extract 600 mg Cap Take 600 mg by mouth now.  
  
 triamcinolone acetonide 0.1 % topical cream  
Commonly known as:  KENALOG Apply  to affected area two (2) times a day. use thin layer Prescriptions Sent to Pharmacy Refills  
 metoprolol succinate (TOPROL-XL) 100 mg tablet 3 Sig: TAKE 1 TABLET BY MOUTH DAILY FOR HEART & BLOOD PRESSURE Class: Normal  
 Pharmacy: [place] [id number], [facility] #: [phone] We Performed the Following AMB POC HEMOGLOBIN A1C [08706 CPT(R)] INFLUENZA VIRUS VACCINE, HIGH DOSE SEASONAL, PRESERVATIVE FREE [01403 CPT(R)] Introducing [facility] & HEALTH SERVICES! Sarah Rojo introduces [employer] patient portal. Now you can access parts of your medical record, email your doctor's office, and request medication refills online.    
 
1. In your internet browser, go to [email or web address] 2. Click on the First Time User? Click Here link in the Sign In box. You will see the New Member Sign Up page. 3. Enter your [employer] Access [employer] Access Code exactly as it appears below. You will not need to use this code after youve completed the sign-up process. If you do not sign up before the expiration date, you must request a new code. · [employer] Access Code: [email or web address] Expires: 1/14/2018  8:10 AM 
 
4. Enter the last four digits of your Social Security Number (xxxx) and Date of Birth (mm/dd/yyyy) as indicated and click Submit. You will be taken to the next sign-up page. 5. Create a Dragon Insidet ID. This will be your [employer] Access [employer] login ID and cannot be changed, so think of one that is secure and easy to remember. 6. Create a [employer] Access [employer] password. You can change your password at any time. 7. Enter your Password Reset Question and Answer. This can be used at a later time if you forget your password. 8. Enter your e-mail address. You will receive e-mail notification when new information is available in [address] 9. Click Sign Up. You can now view and download portions of your medical record. 10. Click the Download Summary menu link to download a portable copy of your medical information. If you have questions, please visit the Frequently Asked Questions section of the [employer] Access [employer] website. Remember, [employer] Access [employer] is NOT to be used for urgent needs. For medical emergencies, dial 911. Now available from your iPhone and Android! Please provide this summary of care documentation to your next provider. Your primary care clinician is listed as Julianna Bennett. If you have any questions after today's visit, please call [phone].

## 2018-01-16 ENCOUNTER — OFFICE VISIT (OUTPATIENT)
Dept: INTERNAL MEDICINE CLINIC | Age: 73
End: 2018-01-16

## 2018-01-16 VITALS
HEIGHT: 66 IN | SYSTOLIC BLOOD PRESSURE: 137 MMHG | RESPIRATION RATE: 16 BRPM | OXYGEN SATURATION: 98 % | TEMPERATURE: 96.8 F | HEART RATE: 58 BPM | WEIGHT: 238 LBS | DIASTOLIC BLOOD PRESSURE: 81 MMHG | BODY MASS INDEX: 38.25 KG/M2

## 2018-01-16 DIAGNOSIS — I10 ESSENTIAL HYPERTENSION: ICD-10-CM

## 2018-01-16 DIAGNOSIS — Z63.6 CAREGIVER BURDEN: ICD-10-CM

## 2018-01-16 DIAGNOSIS — E11.65 TYPE 2 DIABETES MELLITUS WITH HYPERGLYCEMIA, WITHOUT LONG-TERM CURRENT USE OF INSULIN (HCC): Primary | ICD-10-CM

## 2018-01-16 DIAGNOSIS — E11.21 TYPE 2 DIABETES MELLITUS WITH NEPHROPATHY (HCC): ICD-10-CM

## 2018-01-16 RX ORDER — CAPTOPRIL 25 MG/1
TABLET ORAL
Qty: 180 TAB | Refills: 3 | Status: SHIPPED | OUTPATIENT
Start: 2018-01-16 | End: 2018-11-16 | Stop reason: SDUPTHER

## 2018-01-16 RX ORDER — METFORMIN HYDROCHLORIDE 500 MG/1
TABLET ORAL
Qty: 180 TAB | Refills: 3 | Status: SHIPPED | OUTPATIENT
Start: 2018-01-16 | End: 2018-04-23 | Stop reason: SDUPTHER

## 2018-01-16 RX ORDER — HYDROCHLOROTHIAZIDE 25 MG/1
25 TABLET ORAL DAILY
Qty: 90 TAB | Refills: 3 | Status: SHIPPED | OUTPATIENT
Start: 2018-01-16 | End: 2018-11-16 | Stop reason: SDUPTHER

## 2018-01-16 SDOH — SOCIAL STABILITY - SOCIAL INSECURITY: DEPENDENT RELATIVE NEEDING CARE AT HOME: Z63.6

## 2018-01-16 NOTE — PROGRESS NOTES
Chief Complaint   Patient presents with    Hypertension     follow up     I have reviewed the patient's medical history in detail and updated the computerized patient record. Health Maintenance reviewed. 1. Have you been to the ER, urgent care clinic since your last visit? Hospitalized since your last visit?no    2. Have you seen or consulted any other health care providers outside of the [address] [place] [address] since your last visit? Include any pap smears or colon screening.  No    Encouraged pt to discuss pt's wishes with spouse/partner/family and bring them in the next appt to follow thru with the Advanced Directive

## 2018-01-16 NOTE — PROGRESS NOTES
Subjective:     Genie Jones is a 74 y.o. male seen for follow-up of diabetes. He has had hypoglycemic attacks. .no  Blood sugar control has been ok as far as he knows  He has diabetes and hypertension. Genie Jones has the additional concern of taking care of her wife who has dementia and broke her hip. He bought supplies like a hospital etc. Now has POA for wife. Medical supply Co keep calling him, dec braces, I cancelled them all because they keep calling me. Diabetic Review of Systems: no chest pain, dyspnea or TIA's, no numbness, tingling or pain in extremities, no hypoglycemia, no medication side effects noted. No need for further prostate CA Rx sees DrCote yearly    Allergies   Allergen Reactions    Lipitor [Atorvastatin] Itching       Diet and Lifestyle: does not rigorously follow a diabetic diet, nonsmoker. Current Outpatient Prescriptions   Medication Sig Dispense Refill    metoprolol succinate (TOPROL-XL) 100 mg tablet TAKE 1 TABLET BY MOUTH DAILY FOR HEART & BLOOD PRESSURE 90 Tab 3    naproxen (NAPROSYN) 500 mg tablet TAKE 1 TABLET BY MOUTH TWO (2) TIMES DAILY (WITH MEALS). AS NEEDED FOR GOUT 60 Tab 5    captopril (CAPOTEN) 25 mg tablet TAKE 1 TABLET BY MOUTH TWO (2) TIMES A DAY FOR BLOOD PRESSURE 180 Tab 1    triamcinolone acetonide (KENALOG) 0.1 % topical cream Apply  to affected area two (2) times a day. use thin layer 30 g 1    hydroCHLOROthiazide (HYDRODIURIL) 25 mg tablet Take 1 Tab by mouth daily. 90 Tab 3    metFORMIN (GLUCOPHAGE) 500 mg tablet TAKE 1 TABLET BY MOUTH TWO (2) TIMES DAILY (WITH MEALS). 180 Tab 3    lisinopril (PRINIVIL, ZESTRIL) 10 mg tablet Take 1 Tab by mouth daily. 30 Tab 5    red yeast rice extract 600 mg cap Take 600 mg by mouth now.        Allergies   Allergen Reactions    Lipitor [Atorvastatin] Itching     Social History   Substance Use Topics    Smoking status: Never Smoker    Smokeless tobacco: Current User      Comment: snuff    Alcohol use No      Comment: seldom        Lab Results  Component Value Date/Time   WBC 3.9 06/15/2017 08:00 AM   HGB 11.4 06/15/2017 08:00 AM   HCT 35.1 06/15/2017 08:00 AM   PLATELET 868 49/61/3026 08:00 AM   MCV 81 06/15/2017 08:00 AM     Lab Results  Component Value Date/Time   Hemoglobin A1c 7.7 06/15/2017 08:00 AM   Hemoglobin A1c 7.4 01/30/2017 08:33 AM   Hemoglobin A1c 7.6 10/03/2016 12:36 PM   Glucose 122 06/15/2017 08:00 AM   Glucose (POC) 122 01/07/2015 08:53 AM   Microalb/Creat ratio (ug/mg creat.) 38.9 01/30/2017 08:24 AM   LDL, calculated 128 06/15/2017 08:00 AM   Creatinine 1.24 06/15/2017 08:00 AM      Lab Results  Component Value Date/Time   ALT (SGPT) 6 06/01/2015 09:55 AM   AST (SGOT) 11 06/01/2015 09:55 AM   Alk. phosphatase 49 06/01/2015 09:55 AM   Bilirubin, total 0.4 06/01/2015 09:55 AM   Albumin 4.3 06/01/2015 09:55 AM   Protein, total 6.7 06/01/2015 09:55 AM   PLATELET 102 23/74/1412 08:00 AM       Lab Results  Component Value Date/Time   GFR est non-AA 57 06/15/2017 08:00 AM   GFR est AA 66 06/15/2017 08:00 AM   Creatinine 1.24 06/15/2017 08:00 AM   BUN 16 06/15/2017 08:00 AM   Sodium 142 06/15/2017 08:00 AM   Potassium 3.6 06/15/2017 08:00 AM   Chloride 99 06/15/2017 08:00 AM   CO2 26 06/15/2017 08:00 AM     Lab Results   Component Value Date/Time    Glucose 122 06/15/2017 08:00 AM    Glucose (POC) 122 01/07/2015 08:53 AM         Review of Systems  Pertinent items are noted in HPI. Objective:     Significant for the following:     Visit Vitals    /81 (BP 1 Location: Left arm, BP Patient Position: Sitting)    Pulse (!) 58    Temp 96.8 °F (36 °C) (Oral)    Resp 16    Ht 5' 6\" (1.676 m)    Wt 238 lb (108 kg)    SpO2 98%    BMI 38.41 kg/m2     Appearance: alert, well appearing, and in no distress. Exam: heart sounds normal rate and regular rhythm, S1 and S2 normal, systolic murmur 2/6 at 2nd left intercostal space, chest clear  Foot exam: Diabetic foot exam was performed.   No obvious sores or red lesions. Sensation checked by monofilament exam which was normal.  Dorsalis pedis pulse wasdec. Lab review: orders written for new lab studies as appropriate; see orders. Assessment/Plan:     Follow-up diabetes stable. Diabetic issues reviewed with him: foot care discussed and Podiatry visits discussed and glycohemoglobin and other lab monitoring discussed. Chronic Conditions Addressed Today     1. Type 2 diabetes mellitus with nephropathy ([facility] [address]    2. Type 2 diabetes mellitus with hyperglycemia, without long-term current use of insulin (HCC) - Primary    3. Essential hypertension      Acute Diagnoses Addressed Today     Caregiver burden            Discussed above  Dm stable  HTN stable  Orders Placed This Encounter    METABOLIC PANEL, BASIC    HEMOGLOBIN A1C WITH EAG    metFORMIN (GLUCOPHAGE) 500 mg tablet     Sig: TAKE 1 TABLET BY MOUTH TWO (2) TIMES DAILY (WITH MEALS). Dispense:  180 Tab     Refill:  3    hydroCHLOROthiazide (HYDRODIURIL) 25 mg tablet     Sig: Take 1 Tab by mouth daily. Dispense:  90 Tab     Refill:  3    captopril (CAPOTEN) 25 mg tablet     Sig: TAKE 1 TABLET BY MOUTH TWO (2) TIMES A DAY FOR BLOOD PRESSURE     Dispense:  180 Tab     Refill:  3     Chronic Conditions Addressed Today     1. Type 2 diabetes mellitus with nephropathy (HCC)     Relevant Medications     metFORMIN (GLUCOPHAGE) 500 mg tablet     hydroCHLOROthiazide (HYDRODIURIL) 25 mg tablet     captopril (CAPOTEN) 25 mg tablet    2. Type 2 diabetes mellitus with hyperglycemia, without long-term current use of insulin (HCC) - Primary     Relevant Medications     metFORMIN (GLUCOPHAGE) 500 mg tablet     captopril (CAPOTEN) 25 mg tablet     Other Relevant Orders     HEMOGLOBIN A1C WITH EAG    3.  Essential hypertension     Relevant Medications     hydroCHLOROthiazide (HYDRODIURIL) 25 mg tablet     captopril (CAPOTEN) 25 mg tablet     Other Relevant Orders     METABOLIC PANEL, BASIC      Acute Diagnoses Addressed Today     Caregiver burden              Follow-up Disposition:  Return in about 4 months (around 5/16/2018) for routine follow up.

## 2018-01-16 NOTE — MR AVS SNAPSHOT
[address] [place] [address] 
[phone] Patient: Samy Carter MRN: [id number] :[id number] Visit Information Date & Time Provider Department Dept. Phone Encounter #  
 2018  8:00 AM MD Lexis Raya Dr [id number] Follow-up Instructions Return in about 4 months (around 2018) for routine follow up. Upcoming Health Maintenance Date Due MICROALBUMIN Q1 2018 HEMOGLOBIN A1C Q6M 2018 FOOT EXAM Q1 6/15/2018 LIPID PANEL Q1 6/15/2018 MEDICARE YEARLY EXAM 2018 EYE EXAM RETINAL OR DILATED Q1 2018 GLAUCOMA SCREENING Q2Y 2019 COLONOSCOPY 2025 DTaP/Tdap/Td series (2 - Td) 6/15/2027 Allergies as of 2018  Review Complete On: 2018 By: Roxanne Amaya MD  
  
 Severity Noted Reaction Type Reactions Lipitor [Atorvastatin]  10/23/2013    Itching Current Immunizations  Reviewed on 2018 Name Date Influenza High Dose Vaccine PF 10/16/2017, 2016 Influenza Vaccine 10/23/2013 Influenza Vaccine Simonne Mcburney) 2014 Influenza Vaccine (Quad) PF 10/28/2015 Pneumococcal Conjugate (PCV-13) 2016 Pneumococcal Polysaccharide (PPSV-23) 2014 Tdap 6/15/2017 Reviewed by Roxanne Amaya MD on 2018 at  8:13 AM  
You Were Diagnosed With   
  
 Codes Comments Type 2 diabetes mellitus with hyperglycemia, without long-term current use of insulin (HCC)    -  Primary ICD-10-CM: E11.65 ICD-9-CM: 250.00, 790.29 Essential hypertension     ICD-10-CM: I10 
ICD-9-CM: 401.9 Type 2 diabetes mellitus with nephropathy (HCC)     ICD-10-CM: E11.21 
ICD-9-CM: 250.40, 583.81 Caregiver burden     ICD-10-CM: Z63.6 ICD-9-CM: V61.49 Vitals BP Pulse Temp Resp Height(growth percentile) Weight(growth percentile) 137/81 (BP 1 Location: Left arm, BP Patient Position: Sitting) (!) 58 96.8 °F (36 °C) (Oral) 16 5' 6\" (1.676 m) 238 lb (108 kg) SpO2 BMI Smoking Status 98% 38.41 kg/m2 Never Smoker BMI and BSA Data Body Mass Index Body Surface Area  
 38.41 kg/m 2 2.24 m 2 Preferred Pharmacy [facility] Phone [facility] [address] [phone] Your Updated Medication List  
  
   
This list is accurate as of: 1/16/18  8:31 AM.  Always use your most recent med list.  
  
  
  
  
 captopril 25 mg tablet Commonly known as:  CAPOTEN  
TAKE 1 TABLET BY MOUTH TWO (2) TIMES A DAY FOR BLOOD PRESSURE  
  
 hydroCHLOROthiazide 25 mg tablet Commonly known as:  HYDRODIURIL Take 1 Tab by mouth daily. lisinopril 10 mg tablet Commonly known as:  Smith Suly Take 1 Tab by mouth daily. metFORMIN 500 mg tablet Commonly known as:  GLUCOPHAGE  
TAKE 1 TABLET BY MOUTH TWO (2) TIMES DAILY (WITH MEALS). metoprolol succinate 100 mg tablet Commonly known as:  TOPROL-XL  
TAKE 1 TABLET BY MOUTH DAILY FOR HEART & BLOOD PRESSURE  
  
 naproxen 500 mg tablet Commonly known as:  NAPROSYN  
TAKE 1 TABLET BY MOUTH TWO (2) TIMES DAILY (WITH MEALS). AS NEEDED FOR GOUT  
  
 red yeast rice extract 600 mg Cap Take 600 mg by mouth now.  
  
 triamcinolone acetonide 0.1 % topical cream  
Commonly known as:  KENALOG Apply  to affected area two (2) times a day. use thin layer Prescriptions Sent to Pharmacy Refills  
 metFORMIN (GLUCOPHAGE) 500 mg tablet 3 Sig: TAKE 1 TABLET BY MOUTH TWO (2) TIMES DAILY (WITH MEALS). Class: Normal  
 Pharmacy: [facility] #: [phone]  
 hydroCHLOROthiazide (HYDRODIURIL) 25 mg tablet 3 Sig: Take 1 Tab by mouth daily.   
 Class: Normal  
 [facility]: [facility] [facility] Ph #: [phone] Route: Oral  
 captopril (CAPOTEN) 25 mg tablet 3 Sig: TAKE 1 TABLET BY MOUTH TWO (2) TIMES A DAY FOR BLOOD PRESSURE Class: Normal  
 Pharmacy: [facility] [address], [address] [facility] Ph #: [phone] We Performed the Following HEMOGLOBIN A1C WITH EAG [84880 CPT(R)] METABOLIC PANEL, BASIC [15827 CPT(R)] Follow-up Instructions Return in about 4 months (around 5/16/2018) for routine follow up. Introducing [facility] & HEALTH SERVICES! Abel Nunn introduces [employer] patient portal. Now you can access parts of your medical record, email your doctor's office, and request medication refills online. 1. In your internet browser, go to [email or web address] 2. Click on the First Time User? Click Here link in the Sign In box. You will see the New Member Sign Up page. 3. Enter your Re[employer] Access Code exactly as it appears below. You will not need to use this code after youve completed the sign-up process. If you do not sign up before the expiration date, you must request a new code. · [employer] Access Code: [email or web address] Expires: 4/16/2018  7:49 AM 
 
4. Enter the last four digits of your Social Security Number (xxxx) and Date of Birth (mm/dd/yyyy) as indicated and click Submit. You will be taken to the next sign-up page. 5. Create a Re[employer] ID. This will be your Re[employer] login ID and cannot be changed, so think of one that is secure and easy to remember. 6. Create a Re[employer] password. You can change your password at any time. 7. Enter your Password Reset Question and Answer. This can be used at a later time if you forget your password. 8. Enter your e-mail address. You will receive e-mail notification when new information is available in [address] 9. Click Sign Up. You can now view and download portions of your medical record.  
10. Click the Download Summary menu link to download a portable copy of your medical information. If you have questions, please visit the Frequently Asked Questions section of the [employer] website. Remember, [employer] is NOT to be used for urgent needs. For medical emergencies, dial 911. Now available from your iPhone and Android! Please provide this summary of care documentation to your next provider. Your primary care clinician is listed as Bernardino Zavala. If you have any questions after today's visit, please call [phone].

## 2018-01-16 NOTE — LETTER
1/22/2018 8:40 AM 
 
Mr. Verla Babinski [address] [place] [address] Dear Verla Babinski: 
 
Please find your most recent results below. Resulted Orders METABOLIC PANEL, BASIC Result Value Ref Range Glucose 134 (H) 65 - 99 mg/dL BUN 18 8 - 27 mg/dL Creatinine 1.42 (H) 0.76 - 1.27 mg/dL GFR est non-AA 48 (L) >59 mL/min/1.73 GFR est AA 56 (L) >59 mL/min/1.73  
 BUN/Creatinine ratio 13 10 - 24 Sodium 144 134 - 144 mmol/L Potassium 3.8 3.5 - 5.2 mmol/L Chloride 101 96 - 106 mmol/L  
 CO2 25 18 - 29 mmol/L Calcium 8.9 8.6 - 10.2 mg/dL Narrative Performed at:  [address]  [id number] : Higinio Pal MD, Phone:  [phone] HEMOGLOBIN A1C WITH EAG Result Value Ref Range Hemoglobin A1c 7.7 (H) 4.8 - 5.6 % Comment:  
            Pre-diabetes: 5.7 - 6.4 Diabetes: >6.4 Glycemic control for adults with diabetes: <7.0 Estimated average glucose 174 mg/dL Narrative Performed at:  [address]  [id number] : Higinio Pal MD, Phone:  [phone] CKD REPORT Result Value Ref Range Interpretation Note Comment:  
   Supplemental report is available. Narrative Performed at:  [facility] [place] [address]  [id number] : Alonso Luther PhD, Phone:  [phone] DIABETES PATIENT EDUCATION Result Value Ref Range PDF Image Not applicable Narrative Performed at:  [address] [place] [address]  [id number] : Alonso Luther PhD, Phone:  [phone] RECOMMENDATIONS: 
The results of your most recent labs are normal/ stable. Please follow up as scheduled. You could also review these results through [employer], if you need help getting signed up we can help. Please call me if you have any questions: [phone] Sincerely, Pauly Abel MD

## 2018-01-17 LAB
BUN SERPL-MCNC: 18 MG/DL (ref 8–27)
BUN/CREAT SERPL: 13 (ref 10–24)
CALCIUM SERPL-MCNC: 8.9 MG/DL (ref 8.6–10.2)
CHLORIDE SERPL-SCNC: 101 MMOL/L (ref 96–106)
CO2 SERPL-SCNC: 25 MMOL/L (ref 18–29)
CREAT SERPL-MCNC: 1.42 MG/DL (ref 0.76–1.27)
EST. AVERAGE GLUCOSE BLD GHB EST-MCNC: 174 MG/DL
GLUCOSE SERPL-MCNC: 134 MG/DL (ref 65–99)
HBA1C MFR BLD: 7.7 % (ref 4.8–5.6)
INTERPRETATION: NORMAL
Lab: NORMAL
POTASSIUM SERPL-SCNC: 3.8 MMOL/L (ref 3.5–5.2)
SODIUM SERPL-SCNC: 144 MMOL/L (ref 134–144)

## 2018-04-18 ENCOUNTER — OFFICE VISIT (OUTPATIENT)
Dept: INTERNAL MEDICINE CLINIC | Age: 73
End: 2018-04-18

## 2018-04-18 VITALS
RESPIRATION RATE: 18 BRPM | BODY MASS INDEX: 38.25 KG/M2 | HEART RATE: 65 BPM | WEIGHT: 238 LBS | OXYGEN SATURATION: 99 % | TEMPERATURE: 96.7 F | HEIGHT: 66 IN | SYSTOLIC BLOOD PRESSURE: 138 MMHG | DIASTOLIC BLOOD PRESSURE: 70 MMHG

## 2018-04-18 DIAGNOSIS — E11.21 TYPE 2 DIABETES MELLITUS WITH NEPHROPATHY (HCC): Primary | ICD-10-CM

## 2018-04-18 DIAGNOSIS — M10.00 IDIOPATHIC GOUT, UNSPECIFIED CHRONICITY, UNSPECIFIED SITE: ICD-10-CM

## 2018-04-18 DIAGNOSIS — D64.9 ANEMIA, UNSPECIFIED TYPE: ICD-10-CM

## 2018-04-18 DIAGNOSIS — I10 ESSENTIAL HYPERTENSION: ICD-10-CM

## 2018-04-18 PROBLEM — E66.01 SEVERE OBESITY (BMI 35.0-39.9) WITH COMORBIDITY (HCC): Status: ACTIVE | Noted: 2018-04-18

## 2018-04-18 NOTE — PATIENT INSTRUCTIONS

## 2018-04-18 NOTE — MR AVS SNAPSHOT
Sim Major 
 
 
 [address] [place] [address].[address] [place] [address] 
[phone] Patient: Dyllan Rios MRN: [id number] :1945 Visit Information Date & Time Provider Department Dept. Phone Encounter #  
 2018  8:00 AM Aspen Matthews MD [facility] Kristal Emanuel [id number] Follow-up Instructions Return in about 3 months (around 2018) for medicare annual.  
  
Upcoming Health Maintenance Date Due MICROALBUMIN Q1 2018 LIPID PANEL Q1 6/15/2018 MEDICARE YEARLY EXAM 2018 EYE EXAM RETINAL OR DILATED Q1 2018 HEMOGLOBIN A1C Q6M 2018 FOOT EXAM Q1 2019 GLAUCOMA SCREENING Q2Y 2019 COLONOSCOPY 2025 DTaP/Tdap/Td series (2 - Td) 6/15/2027 Allergies as of 2018  Review Complete On: 2018 By: Aspen Matthews MD  
  
 Severity Noted Reaction Type Reactions Lipitor [Atorvastatin]  10/23/2013    Itching Current Immunizations  Reviewed on 2018 Name Date Influenza High Dose Vaccine PF 10/16/2017, 2016 Influenza Vaccine 10/23/2013 Influenza Vaccine Geraldene Aliyah) 2014 Influenza Vaccine (Quad) PF 10/28/2015 Pneumococcal Conjugate (PCV-13) 2016 Pneumococcal Polysaccharide (PPSV-23) 2014 Tdap 6/15/2017 Not reviewed this visit You Were Diagnosed With   
  
 Codes Comments Type 2 diabetes mellitus with nephropathy [facility]    -  Primary ICD-10-CM: E11.21 
ICD-9-CM: 250.40, 583.81 Essential hypertension     ICD-10-CM: I10 
ICD-9-CM: 401.9 Anemia, unspecified type     ICD-10-CM: D64.9 ICD-9-CM: 285.9 Idiopathic gout, unspecified chronicity, unspecified site     ICD-10-CM: M10.00 ICD-9-CM: 274.9 Vitals BP Pulse Temp Resp Height(growth percentile) Weight(growth percentile) 138/70 65 96.7 °F (35.9 °C) (Oral) 18 5' 6\" (1.676 m) 238 lb (108 kg) SpO2 BMI Smoking Status 99% 38.41 kg/m2 Never Smoker Vitals History BMI and BSA Data Body Mass Index Body Surface Area  
 38.41 kg/m 2 2.24 m 2 Preferred Pharmacy Pharmacy Name [facility] [address] [phone] Your Updated Medication List  
  
   
This list is accurate as of 4/18/18  9:01 AM.  Always use your most recent med list.  
  
  
  
  
 captopril 25 mg tablet Commonly known as:  CAPOTEN  
TAKE 1 TABLET BY MOUTH TWO (2) TIMES A DAY FOR BLOOD PRESSURE  
  
 hydroCHLOROthiazide 25 mg tablet Commonly known as:  HYDRODIURIL Take 1 Tab by mouth daily. metFORMIN 500 mg tablet Commonly known as:  GLUCOPHAGE  
TAKE 1 TABLET BY MOUTH TWO (2) TIMES DAILY (WITH MEALS). metoprolol succinate 100 mg tablet Commonly known as:  TOPROL-XL  
TAKE 1 TABLET BY MOUTH DAILY FOR HEART & BLOOD PRESSURE  
  
 naproxen 500 mg tablet Commonly known as:  NAPROSYN  
TAKE 1 TABLET BY MOUTH TWO (2) TIMES DAILY (WITH MEALS). AS NEEDED FOR GOUT We Performed the Following CBC W/O DIFF [29357 CPT(R)] COLLECTION VENOUS BLOOD,VENIPUNCTURE L1580043 CPT(R)] HEMOGLOBIN A1C WITH EAG [54286 CPT(R)] METABOLIC PANEL, BASIC [42831 CPT(R)] MICROALBUMIN, UR, RAND W/ MICROALB/CREAT RATIO [id number] CPT(R)] AR HANDLG&/OR CONVEY OF SPEC FOR TR OFFICE TO LAB [98352 CPT(R)] Follow-up Instructions Return in about 3 months (around 7/18/2018) for medicare annual.  
  
  
Patient Instructions Learning About Diabetes Food Guidelines Your Care Instructions Meal planning is important to manage diabetes. It helps keep your blood sugar at a target level (which you set with your doctor). You don't have to eat special foods. You can eat what your family eats, including sweets once in a while. But you do have to pay attention to how often you eat and how much you eat of certain foods. You may want to work with a dietitian or a certified diabetes educator (CDE) to help you plan meals and snacks. A dietitian or CDE can also help you lose weight if that is one of your goals. What should you know about eating carbs? Managing the amount of carbohydrate (carbs) you eat is an important part of healthy meals when you have diabetes. Carbohydrate is found in many foods. · Learn which foods have carbs. And learn the amounts of carbs in different foods. ¨ Bread, cereal, pasta, and rice have about 15 grams of carbs in a serving. A serving is 1 slice of bread (1 ounce), ½ cup of cooked cereal, or 1/3 cup of cooked pasta or rice. ¨ Fruits have 15 grams of carbs in a serving. A serving is 1 small fresh fruit, such as an apple or orange; ½ of a banana; ½ cup of cooked or canned fruit; ½ cup of fruit juice; 1 cup of melon or raspberries; or 2 tablespoons of dried fruit. ¨ Milk and no-sugar-added yogurt have 15 grams of carbs in a serving. A serving is 1 cup of milk or 2/3 cup of no-sugar-added yogurt. ¨ Starchy vegetables have 15 grams of carbs in a serving. A serving is ½ cup of mashed potatoes or sweet potato; 1 cup winter squash; ½ of a small baked potato; ½ cup of cooked beans; or ½ cup cooked corn or green peas. · Learn how much carbs to eat each day and at each meal. A dietitian or CDE can teach you how to keep track of the amount of carbs you eat. This is called carbohydrate counting. · If you are not sure how to count carbohydrate grams, use the Plate Method to plan meals. It is a good, quick way to make sure that you have a balanced meal. It also helps you spread carbs throughout the day. ¨ Divide your plate by types of foods. Put non-starchy vegetables on half the plate, meat or other protein food on one-quarter of the plate, and a grain or starchy vegetable in the final quarter of the plate.  To this you can add a small piece of fruit and 1 cup of milk or yogurt, depending on how many carbs you are supposed to eat at a meal. 
· Try to eat about the same amount of carbs at each meal. Do not \"save up\" your daily allowance of carbs to eat at one meal. 
· Proteins have very little or no carbs per serving. Examples of proteins are beef, chicken, turkey, fish, eggs, tofu, cheese, cottage cheese, and peanut butter. A serving size of meat is 3 ounces, which is about the size of a deck of cards. Examples of meat substitute serving sizes (equal to 1 ounce of meat) are 1/4 cup of cottage cheese, 1 egg, 1 tablespoon of peanut butter, and ½ cup of tofu. How can you eat out and still eat healthy? · Learn to estimate the serving sizes of foods that have carbohydrate. If you measure food at home, it will be easier to estimate the amount in a serving of restaurant food. · If the meal you order has too much carbohydrate (such as potatoes, corn, or baked beans), ask to have a low-carbohydrate food instead. Ask for a salad or green vegetables. · If you use insulin, check your blood sugar before and after eating out to help you plan how much to eat in the future. · If you eat more carbohydrate at a meal than you had planned, take a walk or do other exercise. This will help lower your blood sugar. What else should you know? · Limit saturated fat, such as the fat from meat and dairy products. This is a healthy choice because people who have diabetes are at higher risk of heart disease. So choose lean cuts of meat and nonfat or low-fat dairy products. Use olive or canola oil instead of butter or shortening when cooking. · Don't skip meals. Your blood sugar may drop too low if you skip meals and take insulin or certain medicines for diabetes. · Check with your doctor before you drink alcohol. Alcohol can cause your blood sugar to drop too low. Alcohol can also cause a bad reaction if you take certain diabetes medicines. Follow-up care is a key part of your treatment and safety.  Be sure to make and go to all appointments, and call your doctor if you are having problems. It's also a good idea to know your test results and keep a list of the medicines you take. Where can you learn more? Go to http://parth-sean.info/. Enter N299 in the search box to learn more about \"Learning About Diabetes Food Guidelines. \" Current as of: March 13, 2017 Content Version: 11.4 © 0777-4488 [employer]. Care instructions adapted under license by [employer] (which disclaims liability or warranty for this information). If you have questions about a medical condition or this instruction, always ask your healthcare professional. [address] any warranty or liability for your use of this information. Introducing [facility]! Dulce Maria Cabrera introduces Bunkr patient portal. Now you can access parts of your medical record, email your doctor's office, and request medication refills online. 1. In your internet browser, go to [email or web address] 2. Click on the First Time User? Click Here link in the Sign In box. You will see the New Member Sign Up page. 3. Enter your Jamplifyt Access Code exactly as it appears below. You will not need to use this code after youve completed the sign-up process. If you do not sign up before the expiration date, you must request a new code. · [facility]r Access Code: [facility] Expires: 7/17/2018  7:51 AM 
 
4. Enter the last four digits of your Social Security Number (xxxx) and Date of Birth (mm/dd/yyyy) as indicated and click Submit. You will be taken to the next sign-up page. 5. Create a Jamplifyt ID. This will be your Bunkr login ID and cannot be changed, so think of one that is secure and easy to remember. 6. Create a Bunkr password. You can change your password at any time. 7. Enter your Password Reset Question and Answer.  This can be used at a later time if you forget your password. 8. Enter your e-mail address. You will receive e-mail notification when new information is available in [address] 9. Click Sign Up. You can now view and download portions of your medical record. 10. Click the Download Summary menu link to download a portable copy of your medical information. If you have questions, please visit the Frequently Asked Questions section of the Worldscape website. Remember, World[employer] is NOT to be used for urgent needs. For medical emergencies, dial 911. Now available from your iPhone and Android! Please provide this summary of care documentation to your next provider. Your primary care clinician is listed as [facility]. If you have any questions after today's visit, please call [phone].

## 2018-04-18 NOTE — PROGRESS NOTES
Subjective:     Janiya Barroso is a 74 y.o. male seen for follow-up of diabetes. Wife passed away, she had some dementia and he has had to stay home to take care of her. He has had hypoglycemic attacks. .no  Blood sugar control has been, doesn't check  He has diabetes and hypertension. Janiya Barroso has the additional concern of feels well in general.  Now that his wife is passed away he is going to start cutting grass again and be more active. His finances are okay she had money stashed away in various places in the house which she has found. Diabetic Review of Systems: no polyuria or polydipsia, no chest pain, dyspnea or TIA's, no numbness, tingling or pain in extremities. Allergies   Allergen Reactions    Lipitor [Atorvastatin] Itching       Diet and Lifestyle: nonsmoker. Patient Active Problem List    Diagnosis Date Noted    Severe obesity (BMI 35.0-39. 9) with comorbidity ([facility] 04/18/2018    Type 2 diabetes mellitus with nephropathy ([facility] 01/16/2018    Type 2 diabetes mellitus with hyperglycemia, without long-term current use of insulin ([facility] 01/30/2017    Prostate cancer ([facility] 02/23/2016    Essential hypertension 05/28/2015    Gout 10/23/2013     Current Outpatient Prescriptions   Medication Sig Dispense Refill    metFORMIN (GLUCOPHAGE) 500 mg tablet TAKE 1 TABLET BY MOUTH TWO (2) TIMES DAILY (WITH MEALS). 180 Tab 3    hydroCHLOROthiazide (HYDRODIURIL) 25 mg tablet Take 1 Tab by mouth daily. 90 Tab 3    captopril (CAPOTEN) 25 mg tablet TAKE 1 TABLET BY MOUTH TWO (2) TIMES A DAY FOR BLOOD PRESSURE 180 Tab 3    metoprolol succinate (TOPROL-XL) 100 mg tablet TAKE 1 TABLET BY MOUTH DAILY FOR HEART & BLOOD PRESSURE 90 Tab 3    naproxen (NAPROSYN) 500 mg tablet TAKE 1 TABLET BY MOUTH TWO (2) TIMES DAILY (WITH MEALS).  AS NEEDED FOR GOUT 60 Tab 5     Allergies   Allergen Reactions    Lipitor [Atorvastatin] Itching     Social History   Substance Use Topics    Smoking status: Never Smoker    Smokeless tobacco: Former User      Comment: snuff    Alcohol use No      Comment: seldom        Lab Results  Component Value Date/Time   WBC 3.9 06/15/2017 08:00 AM   HGB 11.4 (L) 06/15/2017 08:00 AM   HCT 35.1 (L) 06/15/2017 08:00 AM   PLATELET 336 61/25/4575 08:00 AM   MCV 81 06/15/2017 08:00 AM     Lab Results  Component Value Date/Time   Hemoglobin A1c 7.7 (H) 01/16/2018 08:25 AM   Hemoglobin A1c 7.7 (H) 06/15/2017 08:00 AM   Hemoglobin A1c 7.4 (H) 01/30/2017 08:33 AM   Glucose 134 (H) 01/16/2018 08:25 AM   Glucose (POC) 122 (H) 01/07/2015 08:53 AM   Microalb/Creat ratio (ug/mg creat.) 38.9 (H) 01/30/2017 08:24 AM   LDL, calculated 128 (H) 06/15/2017 08:00 AM   Creatinine 1.42 (H) 01/16/2018 08:25 AM      Lab Results  Component Value Date/Time   Cholesterol, total 197 06/15/2017 08:00 AM   HDL Cholesterol 50 06/15/2017 08:00 AM   LDL, calculated 128 (H) 06/15/2017 08:00 AM   Triglyceride 95 06/15/2017 08:00 AM     Lab Results  Component Value Date/Time   ALT (SGPT) 6 06/01/2015 09:55 AM   AST (SGOT) 11 06/01/2015 09:55 AM   Alk. phosphatase 49 06/01/2015 09:55 AM   Bilirubin, total 0.4 06/01/2015 09:55 AM   Albumin 4.3 06/01/2015 09:55 AM   Protein, total 6.7 06/01/2015 09:55 AM   PLATELET 979 13/98/4427 08:00 AM       Lab Results  Component Value Date/Time   GFR est non-AA 48 (L) 01/16/2018 08:25 AM   GFR est AA 56 (L) 01/16/2018 08:25 AM   Creatinine 1.42 (H) 01/16/2018 08:25 AM   BUN 18 01/16/2018 08:25 AM   Sodium 144 01/16/2018 08:25 AM   Potassium 3.8 01/16/2018 08:25 AM   Chloride 101 01/16/2018 08:25 AM   CO2 25 01/16/2018 08:25 AM     Lab Results   Component Value Date/Time    Glucose 134 (H) 01/16/2018 08:25 AM    Glucose (POC) 122 (H) 01/07/2015 08:53 AM         Review of Systems  Pertinent items are noted in HPI.     Objective:     Significant for the following:     Visit Vitals    /70    Pulse 65    Temp 96.7 °F (35.9 °C) (Oral)    Resp 18    Ht 5' 6\" (1.676 m)    Wt 238 lb (108 kg)    SpO2 99%    BMI 38.41 kg/m2     Appearance: alert, well appearing, and in no distress. Exam: heart sounds normal rate, regular rhythm, normal S1, S2, no murmurs, rubs, clicks or gallops, chest clear  Foot exam: Diabetic foot exam was performed. No obvious sores or red lesions. Sensation checked by monofilament exam which was normal.  Dorsalis pedis pulse was red Bi. Lab review: labs reviewed, I note that glycosylated hemoglobin mildly abnormal but acceptable. Assessment/Plan:     Follow-up diabetes stable. Diabetic issues reviewed with him: diabetic diet discussed in detail, written exchange diet given, all medications, side effects and compliance discussed carefully, foot care discussed and Podiatry visits discussed, annual eye examinations at Ophthalmology discussed and glycohemoglobin and other lab monitoring discussed. Chronic Conditions Addressed Today     1. Gout    2. Essential hypertension     Relevant Orders     METABOLIC PANEL, BASIC     MA HANDLG&/OR CONVEY OF SPEC FOR TR OFFICE TO LAB     COLLECTION VENOUS BLOOD,VENIPUNCTURE    3. Type 2 diabetes mellitus with nephropathy (HCC) - Primary     Relevant Orders     MICROALBUMIN, UR, RAND W/ MICROALB/CREAT RATIO     HEMOGLOBIN A1C WITH EAG     MA HANDLG&/OR CONVEY OF SPEC FOR TR OFFICE TO LAB     COLLECTION VENOUS BLOOD,VENIPUNCTURE      Acute Diagnoses Addressed Today     Anemia, unspecified type            Relevant Orders        CBC W/O DIFF        MA HANDLG&/OR CONVEY OF SPEC FOR TR OFFICE TO LAB        COLLECTION VENOUS BLOOD,VENIPUNCTURE        Orders Placed This Encounter    MICROALBUMIN, UR, RAND W/ MICROALB/CREAT RATIO    METABOLIC PANEL, BASIC    HEMOGLOBIN A1C WITH EAG    CBC W/O DIFF    MA HANDLG&/OR CONVEY OF SPEC FOR TR OFFICE TO LAB    COLLECTION VENOUS BLOOD,VENIPUNCTURE     Diabetes unclear how well controlled. Counseled on diet gained some weight lately.   Hypertension stable, anemia check blood count, it is probably from his chronic renal insufficiency.     Follow-up Disposition:  Return in about 3 months (around 7/18/2018) for medicare annual.

## 2018-04-19 ENCOUNTER — TELEPHONE (OUTPATIENT)
Dept: INTERNAL MEDICINE CLINIC | Age: 73
End: 2018-04-19

## 2018-04-19 LAB
ALBUMIN/CREAT UR: 133.5 MG/G CREAT (ref 0–30)
BUN SERPL-MCNC: 19 MG/DL (ref 8–27)
BUN/CREAT SERPL: 13 (ref 10–24)
CALCIUM SERPL-MCNC: 9.2 MG/DL (ref 8.6–10.2)
CHLORIDE SERPL-SCNC: 100 MMOL/L (ref 96–106)
CO2 SERPL-SCNC: 27 MMOL/L (ref 18–29)
CREAT SERPL-MCNC: 1.44 MG/DL (ref 0.76–1.27)
CREAT UR-MCNC: 88.6 MG/DL
ERYTHROCYTE [DISTWIDTH] IN BLOOD BY AUTOMATED COUNT: 14.6 % (ref 12.3–15.4)
EST. AVERAGE GLUCOSE BLD GHB EST-MCNC: 214 MG/DL
GFR SERPLBLD CREATININE-BSD FMLA CKD-EPI: 47 ML/MIN/1.73
GFR SERPLBLD CREATININE-BSD FMLA CKD-EPI: 55 ML/MIN/1.73
GLUCOSE SERPL-MCNC: 170 MG/DL (ref 65–99)
HBA1C MFR BLD: 9.1 % (ref 4.8–5.6)
HCT VFR BLD AUTO: 35.9 % (ref 37.5–51)
HGB BLD-MCNC: 11.3 G/DL (ref 13–17.7)
INTERPRETATION: NORMAL
Lab: NORMAL
MCH RBC QN AUTO: 26 PG (ref 26.6–33)
MCHC RBC AUTO-ENTMCNC: 31.5 G/DL (ref 31.5–35.7)
MCV RBC AUTO: 83 FL (ref 79–97)
MICROALBUMIN UR-MCNC: 118.3 UG/ML
PLATELET # BLD AUTO: 203 X10E3/UL (ref 150–379)
POTASSIUM SERPL-SCNC: 4 MMOL/L (ref 3.5–5.2)
RBC # BLD AUTO: 4.35 X10E6/UL (ref 4.14–5.8)
SODIUM SERPL-SCNC: 142 MMOL/L (ref 134–144)
WBC # BLD AUTO: 5.5 X10E3/UL (ref 3.4–10.8)

## 2018-04-23 ENCOUNTER — TELEPHONE (OUTPATIENT)
Dept: INTERNAL MEDICINE CLINIC | Age: 73
End: 2018-04-23

## 2018-04-23 RX ORDER — METFORMIN HYDROCHLORIDE 500 MG/1
500 TABLET ORAL
Qty: 270 TAB | Refills: 3 | Status: SHIPPED | OUTPATIENT
Start: 2018-04-23 | End: 2019-02-19

## 2018-04-23 NOTE — TELEPHONE ENCOUNTER
DM not too good increase metformin. Send results. Diabetes is not as good as it was, Increase metformin to 3 pills a day. Please follow the diabetic diet carefully. Reduce starchy foods and sweet foods. Will need to re-check this levels in a few months. If diabetes worsens will need to increase your medications.

## 2018-05-30 ENCOUNTER — APPOINTMENT (OUTPATIENT)
Dept: CT IMAGING | Age: 73
End: 2018-05-30
Attending: EMERGENCY MEDICINE
Payer: MEDICARE

## 2018-05-30 ENCOUNTER — HOSPITAL ENCOUNTER (EMERGENCY)
Age: 73
Discharge: HOME | End: 2018-05-30
Attending: EMERGENCY MEDICINE | Admitting: EMERGENCY MEDICINE
Payer: MEDICARE

## 2018-05-30 VITALS
BODY MASS INDEX: 36.53 KG/M2 | DIASTOLIC BLOOD PRESSURE: 89 MMHG | HEART RATE: 60 BPM | SYSTOLIC BLOOD PRESSURE: 162 MMHG | TEMPERATURE: 98 F | HEIGHT: 66 IN | RESPIRATION RATE: 16 BRPM | WEIGHT: 227.29 LBS | OXYGEN SATURATION: 95 %

## 2018-05-30 DIAGNOSIS — N20.0 KIDNEY STONE: Primary | ICD-10-CM

## 2018-05-30 LAB
ALBUMIN SERPL-MCNC: 3.8 G/DL (ref 3.5–5)
ALBUMIN/GLOB SERPL: 1 {RATIO} (ref 1.1–2.2)
ALP SERPL-CCNC: 56 U/L (ref 45–117)
ALT SERPL-CCNC: 20 U/L (ref 12–78)
ANION GAP SERPL CALC-SCNC: 11 MMOL/L (ref 5–15)
APPEARANCE UR: CLEAR
AST SERPL-CCNC: 31 U/L (ref 15–37)
BACTERIA URNS QL MICRO: NEGATIVE /HPF
BASOPHILS # BLD: 0 K/UL (ref 0–0.1)
BASOPHILS NFR BLD: 0 % (ref 0–1)
BILIRUB SERPL-MCNC: 0.4 MG/DL (ref 0.2–1)
BILIRUB UR QL: NEGATIVE
BUN SERPL-MCNC: 32 MG/DL (ref 6–20)
BUN/CREAT SERPL: 16 (ref 12–20)
CALCIUM SERPL-MCNC: 9.4 MG/DL (ref 8.5–10.1)
CHLORIDE SERPL-SCNC: 102 MMOL/L (ref 97–108)
CO2 SERPL-SCNC: 27 MMOL/L (ref 21–32)
COLOR UR: ABNORMAL
CREAT SERPL-MCNC: 1.97 MG/DL (ref 0.7–1.3)
DIFFERENTIAL METHOD BLD: ABNORMAL
EOSINOPHIL # BLD: 0.1 K/UL (ref 0–0.4)
EOSINOPHIL NFR BLD: 1 % (ref 0–7)
EPITH CASTS URNS QL MICRO: ABNORMAL /LPF
ERYTHROCYTE [DISTWIDTH] IN BLOOD BY AUTOMATED COUNT: 13.8 % (ref 11.5–14.5)
GLOBULIN SER CALC-MCNC: 4 G/DL (ref 2–4)
GLUCOSE SERPL-MCNC: 156 MG/DL (ref 65–100)
GLUCOSE UR STRIP.AUTO-MCNC: NEGATIVE MG/DL
HCT VFR BLD AUTO: 37.7 % (ref 36.6–50.3)
HGB BLD-MCNC: 12 G/DL (ref 12.1–17)
HGB UR QL STRIP: ABNORMAL
HYALINE CASTS URNS QL MICRO: ABNORMAL /LPF (ref 0–5)
IMM GRANULOCYTES # BLD: 0 K/UL (ref 0–0.04)
IMM GRANULOCYTES NFR BLD AUTO: 0 % (ref 0–0.5)
KETONES UR QL STRIP.AUTO: NEGATIVE MG/DL
LEUKOCYTE ESTERASE UR QL STRIP.AUTO: NEGATIVE
LYMPHOCYTES # BLD: 0.7 K/UL (ref 0.8–3.5)
LYMPHOCYTES NFR BLD: 8 % (ref 12–49)
MCH RBC QN AUTO: 26.3 PG (ref 26–34)
MCHC RBC AUTO-ENTMCNC: 31.8 G/DL (ref 30–36.5)
MCV RBC AUTO: 82.7 FL (ref 80–99)
MONOCYTES # BLD: 0.7 K/UL (ref 0–1)
MONOCYTES NFR BLD: 8 % (ref 5–13)
NEUTS SEG # BLD: 7.2 K/UL (ref 1.8–8)
NEUTS SEG NFR BLD: 83 % (ref 32–75)
NITRITE UR QL STRIP.AUTO: NEGATIVE
NRBC # BLD: 0 K/UL (ref 0–0.01)
NRBC BLD-RTO: 0 PER 100 WBC
PH UR STRIP: 5.5 [PH] (ref 5–8)
PLATELET # BLD AUTO: 217 K/UL (ref 150–400)
PMV BLD AUTO: 10.6 FL (ref 8.9–12.9)
POTASSIUM SERPL-SCNC: 3.7 MMOL/L (ref 3.5–5.1)
PROT SERPL-MCNC: 7.8 G/DL (ref 6.4–8.2)
PROT UR STRIP-MCNC: ABNORMAL MG/DL
RBC # BLD AUTO: 4.56 M/UL (ref 4.1–5.7)
RBC #/AREA URNS HPF: ABNORMAL /HPF (ref 0–5)
RBC MORPH BLD: ABNORMAL
SODIUM SERPL-SCNC: 140 MMOL/L (ref 136–145)
SP GR UR REFRACTOMETRY: 1.02 (ref 1–1.03)
UROBILINOGEN UR QL STRIP.AUTO: 0.2 EU/DL (ref 0.2–1)
WBC # BLD AUTO: 8.7 K/UL (ref 4.1–11.1)
WBC URNS QL MICRO: ABNORMAL /HPF (ref 0–4)

## 2018-05-30 PROCEDURE — 85025 COMPLETE CBC W/AUTO DIFF WBC: CPT | Performed by: EMERGENCY MEDICINE

## 2018-05-30 PROCEDURE — 36415 COLL VENOUS BLD VENIPUNCTURE: CPT | Performed by: EMERGENCY MEDICINE

## 2018-05-30 PROCEDURE — 74176 CT ABD & PELVIS W/O CONTRAST: CPT

## 2018-05-30 PROCEDURE — 99283 EMERGENCY DEPT VISIT LOW MDM: CPT

## 2018-05-30 PROCEDURE — 80053 COMPREHEN METABOLIC PANEL: CPT | Performed by: EMERGENCY MEDICINE

## 2018-05-30 PROCEDURE — 81001 URINALYSIS AUTO W/SCOPE: CPT | Performed by: EMERGENCY MEDICINE

## 2018-05-30 RX ORDER — TRAMADOL HYDROCHLORIDE 50 MG/1
50 TABLET ORAL
Qty: 10 TAB | Refills: 0 | Status: SHIPPED | OUTPATIENT
Start: 2018-05-30 | End: 2018-06-06 | Stop reason: ALTCHOICE

## 2018-05-30 RX ORDER — TAMSULOSIN HYDROCHLORIDE 0.4 MG/1
0.4 CAPSULE ORAL DAILY
Qty: 7 CAP | Refills: 0 | Status: SHIPPED | OUTPATIENT
Start: 2018-05-30 | End: 2018-06-06 | Stop reason: ALTCHOICE

## 2018-05-30 NOTE — ED PROVIDER NOTES
EMERGENCY DEPARTMENT HISTORY AND PHYSICAL EXAM      Date: 5/30/2018  Patient Name: Newt Shone    History of Presenting Illness     Chief Complaint   Patient presents with    Flank Pain     pt reports right flank pain x3 days, abdominal pain this morning, denies N/V/D or urinary symptoms    Abdominal Pain       History Provided By: Patient    HPI: Newt Shone, 74 y.o. male with PMHx significant for DM and HTN, presents ambulatory to the ED with cc of moderate to severe, constant, sharp right flank pain radiating to the RLQ and intermittently down the RLE progressively worsening over the last 4 days. Pt reports no associated sx. Pt describes right flank and abdominal pain as 9-10 severity. Pt discloses no h/o kidney stones and denies any exacerbating or relieving factors. He denies taking any medications for pain PTA. He specifically denies any fevers, chills, nausea, vomiting, chest pain, shortness of breath, headache, rash, diarrhea, hematuria, sweating or weight loss. Chief Complaint: flank pain  Duration: 4 Days  Timing:  Constant  Location: right flank  Quality: Stabbing  Severity: 9 out of 10  Modifying Factors: N/A  Associated Symptoms: right leg pain      There are no other complaints, changes, or physical findings at this time. PCP: Nerissa Miller MD    Current Outpatient Prescriptions   Medication Sig Dispense Refill    tamsulosin (FLOMAX) 0.4 mg capsule Take 1 Cap by mouth daily for 7 doses. 7 Cap 0    traMADol (ULTRAM) 50 mg tablet Take 1 Tab by mouth every six (6) hours as needed for Pain. Max Daily Amount: 200 mg. Indications: Pain 10 Tab 0    metFORMIN (GLUCOPHAGE) 500 mg tablet Take 1 Tab by mouth three (3) times daily (with meals). 270 Tab 3    hydroCHLOROthiazide (HYDRODIURIL) 25 mg tablet Take 1 Tab by mouth daily.  90 Tab 3    captopril (CAPOTEN) 25 mg tablet TAKE 1 TABLET BY MOUTH TWO (2) TIMES A DAY FOR BLOOD PRESSURE 180 Tab 3    metoprolol succinate (TOPROL-XL) 100 mg tablet TAKE 1 TABLET BY MOUTH DAILY FOR HEART & BLOOD PRESSURE 90 Tab 3    naproxen (NAPROSYN) 500 mg tablet TAKE 1 TABLET BY MOUTH TWO (2) TIMES DAILY (WITH MEALS). AS NEEDED FOR GOUT 60 Tab 5       Past History     Past Medical History:  Past Medical History:   Diagnosis Date    Arthritis     Cancer ([facility] [address]     Prostate    Diabetes ([facility] [address]     Gout     Hypertension        Past Surgical History:  Past Surgical History:   Procedure Laterality Date    COLONOSCOPY,REMV LESN,SNARE  1/7/2015         HX APPENDECTOMY      burst    HX ORTHOPAEDIC      toe       Family History:  History reviewed. No pertinent family history. Social History:  Social History   Substance Use Topics    Smoking status: Never Smoker    Smokeless tobacco: Former User      Comment: snuff    Alcohol use No      Comment: seldom       Allergies: Allergies   Allergen Reactions    Lipitor [Atorvastatin] Itching         Review of Systems   Review of Systems   Constitutional: Negative. Negative for activity change, appetite change, chills, fatigue, fever and unexpected weight change. HENT: Negative. Negative for congestion, hearing loss, rhinorrhea, sneezing and voice change. Eyes: Negative. Negative for pain and visual disturbance. Respiratory: Negative. Negative for apnea, cough, choking, chest tightness and shortness of breath. Cardiovascular: Negative. Negative for chest pain and palpitations. Gastrointestinal: Positive for abdominal pain (RLQ). Negative for abdominal distention, blood in stool, diarrhea, nausea and vomiting. Genitourinary: Positive for flank pain (right radiating to RLQ and intermittently down RLE ). Negative for difficulty urinating, enuresis, frequency, hematuria and urgency. No discharge   Musculoskeletal: Negative for arthralgias, back pain, myalgias and neck stiffness. Skin: Negative. Negative for color change and rash. Neurological: Negative.   Negative for dizziness, seizures, syncope, speech difficulty, weakness, numbness and headaches. Hematological: Negative for adenopathy. Psychiatric/Behavioral: Negative. Negative for agitation, behavioral problems, dysphoric mood and suicidal ideas. The patient is not nervous/anxious. Physical Exam   Physical Exam   Constitutional: He is oriented to person, place, and time. He appears well-developed and well-nourished. No distress. HENT:   Head: Normocephalic and atraumatic. Mouth/Throat: Oropharynx is clear and moist. No oropharyngeal exudate. Eyes: Conjunctivae and EOM are normal. Pupils are equal, round, and reactive to light. Right eye exhibits no discharge. Left eye exhibits no discharge. Neck: Normal range of motion. Neck supple. Cardiovascular: Normal rate, regular rhythm and intact distal pulses. Exam reveals no gallop and no friction rub. No murmur heard. Pulmonary/Chest: Effort normal and breath sounds normal. No respiratory distress. He has no wheezes. He has no rales. He exhibits no tenderness. Abdominal: Soft. Bowel sounds are normal. He exhibits no distension and no mass. There is no tenderness. There is no rebound and no guarding. Musculoskeletal: Normal range of motion. He exhibits no edema. Lymphadenopathy:     He has no cervical adenopathy. Neurological: He is alert and oriented to person, place, and time. No cranial nerve deficit. Coordination normal.   Skin: Skin is warm and dry. No rash noted. No erythema. Psychiatric: He has a normal mood and affect. Nursing note and vitals reviewed.       Diagnostic Study Results     Labs -     Recent Results (from the past 12 hour(s))   CBC WITH AUTOMATED DIFF    Collection Time: 05/30/18  8:55 AM   Result Value Ref Range    WBC 8.7 4.1 - 11.1 K/uL    RBC 4.56 4.10 - 5.70 M/uL    HGB 12.0 (L) 12.1 - 17.0 g/dL    HCT 37.7 36.6 - 50.3 %    MCV 82.7 80.0 - 99.0 FL    MCH 26.3 26.0 - 34.0 PG    MCHC 31.8 30.0 - 36.5 g/dL    RDW 13.8 11.5 - 14.5 %    PLATELET 217 150 - 400 K/uL    MPV 10.6 8.9 - 12.9 FL    NRBC 0.0 0  WBC    ABSOLUTE NRBC 0.00 0.00 - 0.01 K/uL    NEUTROPHILS 83 (H) 32 - 75 %    LYMPHOCYTES 8 (L) 12 - 49 %    MONOCYTES 8 5 - 13 %    EOSINOPHILS 1 0 - 7 %    BASOPHILS 0 0 - 1 %    IMMATURE GRANULOCYTES 0 0.0 - 0.5 %    ABS. NEUTROPHILS 7.2 1.8 - 8.0 K/UL    ABS. LYMPHOCYTES 0.7 (L) 0.8 - 3.5 K/UL    ABS. MONOCYTES 0.7 0.0 - 1.0 K/UL    ABS. EOSINOPHILS 0.1 0.0 - 0.4 K/UL    ABS. BASOPHILS 0.0 0.0 - 0.1 K/UL    ABS. IMM. GRANS. 0.0 0.00 - 0.04 K/UL    DF AUTOMATED      RBC COMMENTS NORMOCYTIC, NORMOCHROMIC     METABOLIC PANEL, COMPREHENSIVE    Collection Time: 05/30/18  8:55 AM   Result Value Ref Range    Sodium 140 136 - 145 mmol/L    Potassium 3.7 3.5 - 5.1 mmol/L    Chloride 102 97 - 108 mmol/L    CO2 27 21 - 32 mmol/L    Anion gap 11 5 - 15 mmol/L    Glucose 156 (H) 65 - 100 mg/dL    BUN 32 (H) 6 - 20 MG/DL    Creatinine 1.97 (H) 0.70 - 1.30 MG/DL    BUN/Creatinine ratio 16 12 - 20      GFR est AA 40 (L) >60 ml/min/1.73m2    GFR est non-AA 33 (L) >60 ml/min/1.73m2    Calcium 9.4 8.5 - 10.1 MG/DL    Bilirubin, total 0.4 0.2 - 1.0 MG/DL    ALT (SGPT) 20 12 - 78 U/L    AST (SGOT) 31 15 - 37 U/L    Alk.  phosphatase 56 45 - 117 U/L    Protein, total 7.8 6.4 - 8.2 g/dL    Albumin 3.8 3.5 - 5.0 g/dL    Globulin 4.0 2.0 - 4.0 g/dL    A-G Ratio 1.0 (L) 1.1 - 2.2     URINALYSIS W/MICROSCOPIC    Collection Time: 05/30/18  8:56 AM   Result Value Ref Range    Color YELLOW/STRAW      Appearance CLEAR CLEAR      Specific gravity 1.018 1.003 - 1.030      pH (UA) 5.5 5.0 - 8.0      Protein TRACE (A) NEG mg/dL    Glucose NEGATIVE  NEG mg/dL    Ketone NEGATIVE  NEG mg/dL    Bilirubin NEGATIVE  NEG      Blood MODERATE (A) NEG      Urobilinogen 0.2 0.2 - 1.0 EU/dL    Nitrites NEGATIVE  NEG      Leukocyte Esterase NEGATIVE  NEG      WBC 0-4 0 - 4 /hpf    RBC 5-10 0 - 5 /hpf    Epithelial cells FEW FEW /lpf    Bacteria NEGATIVE  NEG /hpf    Hyaline cast 0-2 0 - 5 /lpf Radiologic Studies -     CT Results  (Last 48 hours)               05/30/18 0900  CT ABD PELV WO CONT Final result    Impression:  IMPRESSION:       1. 4 mm mid right ureteral calculus at the level of the aortic bifurcation   causes mild proximal obstructive uropathy. 2. Bilateral nephrolithiasis. Narrative:  EXAM:  CT ABD PELV WO CONT       INDICATION: Right flank pain for 3 days. Abdominal pain this morning. Hematuria   on urinalysis. Acute on chronic renal insufficiency. COMPARISON: CT abdomen/pelvis on 11/18/2015. CONTRAST:  None. TECHNIQUE:    Thin axial images were obtained through the abdomen and pelvis. Coronal and   sagittal reconstructions were generated. Oral contrast was not administered. CT   dose reduction was achieved through use of a standardized protocol tailored for   this examination and automatic exposure control for dose modulation. The absence of intravenous contrast material reduces the sensitivity for   evaluation of the solid parenchymal organs of the abdomen. FINDINGS:    LUNG BASES: Clear. INCIDENTALLY IMAGED HEART AND MEDIASTINUM: Unremarkable. LIVER: No evidence of mass. GALLBLADDER: Unremarkable. SPLEEN: Normal size. PANCREAS: No inflammation. ADRENALS: Unremarkable. KIDNEYS/URETERS: Mild right hydronephrosis. Mild right hydroureter. 4 mm   calculus in the right ureter at the level of the aortic bifurcation. Distal   right ureter is not dilated. Bilateral nephrolithiasis. No left hydronephrosis. Simple cyst in the upper pole of the left kidney is unchanged. STOMACH: Nondistended. SMALL BOWEL: No dilatation or wall thickening. COLON: No dilatation or wall thickening. APPENDIX: Unremarkable. PERITONEUM: No ascites or pneumoperitoneum. RETROPERITONEUM: Stranding surrounds the right kidney. Aorta is atherosclerotic   without aneurysm. No lymphadenopathy.    REPRODUCTIVE ORGANS: Prostatomegaly contains radiation implants. URINARY BLADDER: Incompletely distention. BONES: Moderate right and mild left hip osteoarthritis. No aggressive bone   lesion. ADDITIONAL COMMENTS: N/A                 Medical Decision Making   I am the first provider for this patient. I reviewed the vital signs, available nursing notes, past medical history, past surgical history, family history and social history. Vital Signs-Reviewed the patient's vital signs. Patient Vitals for the past 12 hrs:   Temp Pulse Resp BP SpO2   05/30/18 0945 - - - 162/89 95 %   05/30/18 0818 98 °F (36.7 °C) 60 16 (!) 153/99 99 %       Pulse Oximetry Analysis - 99% on RA    Cardiac Monitor:   Rate: 60 bpm  Rhythm: Normal Sinus Rhythm      Records Reviewed: Nursing Notes and Old Medical Records    Provider Notes (Medical Decision Making):   DDx: kidney stones, UTI, constipation, pyelonephritis    ED Course:   Initial assessment performed. The patients presenting problems have been discussed, and they are in agreement with the care plan formulated and outlined with them. I have encouraged them to ask questions as they arise throughout their visit. Progress Notes:    9:46 AM  The patient states that their symptoms have resolved and they feel much better. There are no other new complaints at this time. His questions have been answered. We are awaiting final results and those will be reviewed with them when they become available. Critical Care Time: 0 minutes    Disposition:  Discharge Note:  9:46 AM  The patient is ready for discharge. The patient's signs, symptoms, diagnosis, and discharge instruction have been discussed and the patient has conveyed their understanding. The patient is to follow up as recommended or return to the ER should their symptoms worsen. Plan has been discussed and the patient is in agreement. Written by Valentino Kitchen Barrett ED Scribe, as dictated by [facility]. Devora Pedro MD    PLAN:  1.    Current Discharge Medication List      START taking these medications    Details   tamsulosin (FLOMAX) 0.4 mg capsule Take 1 Cap by mouth daily for 7 doses. Qty: 7 Cap, Refills: 0      traMADol (ULTRAM) 50 mg tablet Take 1 Tab by mouth every six (6) hours as needed for Pain. Max Daily Amount: 200 mg. Indications: Pain  Qty: 10 Tab, Refills: 0    Associated Diagnoses: Kidney stone           2. Follow-up Information     Follow up With Details Comments [address], [place]   [address] [place] [address],[address] [facility]      Tami Gomez MD Call in 2 days As needed, If symptoms worsen [address]  [address] [phone]          Return to ED if worse     Diagnosis     Clinical Impression:   1. Kidney stone        Attestations: This note is prepared by Matilde Ramos. Renetta, acting as Scribe for [facility]. Pascale Santizo, [facility] Pascale Santizo MD: The scribe's documentation has been prepared under my direction and personally reviewed by me in its entirety. I confirm that the note above accurately reflects all work, treatment, procedures, and medical decision making performed by me.

## 2018-05-30 NOTE — ED TRIAGE NOTES
Assumed care of pt from triage. Pt is A&O x 4. Pt reports CC of right sided flank pain x 4 days and abdominal pain since this morning. Pt denies N/V/D. Dr Adamaris Hernández at bedside evaluating pt. Pt placed on monitor x 2. VSS at this time.

## 2018-05-30 NOTE — ED NOTES
Pt discharged by Dr. Laurent Jones. Pt provided with discharge instructions Rx and instructions on follow up care. Pt out of ED ambulatory without difficulty accompanied by family.

## 2018-05-30 NOTE — DISCHARGE INSTRUCTIONS

## 2018-06-06 ENCOUNTER — OFFICE VISIT (OUTPATIENT)
Dept: INTERNAL MEDICINE CLINIC | Age: 73
End: 2018-06-06

## 2018-06-06 VITALS
HEIGHT: 66 IN | TEMPERATURE: 98.2 F | BODY MASS INDEX: 36.96 KG/M2 | OXYGEN SATURATION: 98 % | HEART RATE: 67 BPM | WEIGHT: 230 LBS | SYSTOLIC BLOOD PRESSURE: 154 MMHG | RESPIRATION RATE: 16 BRPM | DIASTOLIC BLOOD PRESSURE: 76 MMHG

## 2018-06-06 DIAGNOSIS — I10 ESSENTIAL HYPERTENSION: ICD-10-CM

## 2018-06-06 DIAGNOSIS — Z01.818 PREOPERATIVE GENERAL PHYSICAL EXAMINATION: Primary | ICD-10-CM

## 2018-06-06 DIAGNOSIS — C61 PROSTATE CANCER (HCC): ICD-10-CM

## 2018-06-06 DIAGNOSIS — E11.65 TYPE 2 DIABETES MELLITUS WITH HYPERGLYCEMIA, WITHOUT LONG-TERM CURRENT USE OF INSULIN (HCC): ICD-10-CM

## 2018-06-06 DIAGNOSIS — E11.21 TYPE 2 DIABETES MELLITUS WITH NEPHROPATHY (HCC): ICD-10-CM

## 2018-06-06 DIAGNOSIS — N20.0 KIDNEY CALCULUS: ICD-10-CM

## 2018-06-06 LAB
BILIRUB UR QL STRIP: NEGATIVE
GLUCOSE UR-MCNC: NEGATIVE MG/DL
KETONES P FAST UR STRIP-MCNC: NEGATIVE MG/DL
PH UR STRIP: 5.5 [PH] (ref 4.6–8)
PROT UR QL STRIP: NEGATIVE
SP GR UR STRIP: 1.01 (ref 1–1.03)
UA UROBILINOGEN AMB POC: NORMAL (ref 0.2–1)
URINALYSIS CLARITY POC: CLEAR
URINALYSIS COLOR POC: YELLOW
URINE BLOOD POC: NORMAL
URINE LEUKOCYTES POC: NORMAL
URINE NITRITES POC: NEGATIVE

## 2018-06-06 NOTE — PROGRESS NOTES
Chief Complaint   Patient presents with    Pre-op Exam     L/eye surgery     I have reviewed the patient's medical history in detail and updated the computerized patient record. Health Maintenance reviewed. 1. Have you been to the ER, urgent care clinic since your last visit? Hospitalized since your last visit?no    2. Have you seen or consulted any other health care providers outside of the [address] [place] [address] Damian since your last visit? Include any pap smears or colon screening. No    Encouraged pt to discuss pt's wishes with spouse/partner/family and bring them in the next appt to follow thru with the Advanced Directive    Fall Risk Assessment, last 12 mths 6/6/2018   Able to walk? Yes   Fall in past 12 months? No       PHQ over the last two weeks 6/6/2018   Little interest or pleasure in doing things Several days   Feeling down, depressed or hopeless Several days   Total Score PHQ 2 2       Abuse Screening Questionnaire 1/16/2018   Do you ever feel afraid of your partner? N   Are you in a relationship with someone who physically or mentally threatens you? N   Is it safe for you to go home?  Y       ADL Assessment 1/16/2018   Feeding yourself No Help Needed   Getting from bed to chair No Help Needed   Getting dressed No Help Needed   Bathing or showering No Help Needed   Walk across the room (includes cane/walker) No Help Needed   Using the telphone No Help Needed   Taking your medications No Help Needed   Preparing meals No Help Needed   Managing money (expenses/bills) No Help Needed   Moderately strenuous housework (laundry) No Help Needed   Shopping for personal items (toiletries/medicines) No Help Needed   Shopping for groceries No Help Needed   Driving No Help Needed   Climbing a flight of stairs No Help Needed   Getting to places beyond walking distances No Help Needed

## 2018-06-06 NOTE — MR AVS SNAPSHOT
[address] [facility] 
[phone] Patient: Zarina Doan MRN: [id number] :1945 Visit Information Date & Time Provider Department Dept. Phone Encounter #  
 2018  9:45 AM MD Lexis Lobo Dr [id number] Follow-up Instructions Return in about 1 week (around 2018), or if symptoms worsen or fail to improve, for routine follow up. Your Appointments 2018  8:15 AM  
PHYSICAL PRE OP with MD Miles [address] ([facility]) Appt Note: mwv $0 cp lsh 18  
 [address] [place] [address]. [address] [facility] [address]  
[phone]  
  
   
 [address] [place] [address] [address] Upcoming Health Maintenance Date Due  
 LIPID PANEL Q1 6/15/2018 MEDICARE YEARLY EXAM 2018 Influenza Age 5 to Adult 2018 HEMOGLOBIN A1C Q6M 10/18/2018 FOOT EXAM Q1 2019 MICROALBUMIN Q1 2019 EYE EXAM RETINAL OR DILATED Q1 2019 GLAUCOMA SCREENING Q2Y 2020 COLONOSCOPY 2025 DTaP/Tdap/Td series (2 - Td) 6/15/2027 Allergies as of 2018  Review Complete On: 2018 By: Kae Greco MD  
  
 Severity Noted Reaction Type Reactions Lipitor [Atorvastatin]  10/23/2013    Itching Current Immunizations  Reviewed on 2018 Name Date Influenza High Dose Vaccine PF 10/16/2017, 2016 Influenza Vaccine 10/23/2013 Influenza Vaccine Mabel Nagy) 2014 Influenza Vaccine (Quad) PF 10/28/2015 Pneumococcal Conjugate (PCV-13) 2016 Pneumococcal Polysaccharide (PPSV-23) 2014 Tdap 6/15/2017 Reviewed by Kae Greco MD on 2018 at 10:09 AM  
You Were Diagnosed With   
  
 Codes Comments Preoperative general physical examination    -  Primary ICD-10-CM: T62.688 ICD-9-CM: V72.83   
 Kidney calculus     ICD-10-CM: N20.0 ICD-9-CM: 592.0 Type 2 diabetes mellitus with hyperglycemia, without long-term current use of insulin (HCC)     ICD-10-CM: E11.65 ICD-9-CM: 250.00, 790.29 Type 2 diabetes mellitus with nephropathy (HCC)     ICD-10-CM: E11.21 
ICD-9-CM: 250.40, 583.81 Prostate cancer [facility])     ICD-10-CM: T49 ICD-9-CM: 621 Essential hypertension     ICD-10-CM: I10 
ICD-9-CM: 401.9 Vitals BP Pulse Temp Resp Height(growth percentile) Weight(growth percentile) 154/76 (BP 1 Location: Left arm, BP Patient Position: Sitting) 67 98.2 °F (36.8 °C) (Oral) 16 5' 6\" (1.676 m) 230 lb (104.3 kg) SpO2 BMI Smoking Status 98% 37.12 kg/m2 Never Smoker Vitals History BMI and BSA Data Body Mass Index Body Surface Area  
 37.12 kg/m 2 2.2 m 2 Preferred Pharmacy Pharmacy Name Phone [facility], [address] [phone] Your Updated Medication List  
  
   
This list is accurate as of 6/6/18 10:25 AM.  Always use your most recent med list.  
  
  
  
  
 captopril 25 mg tablet Commonly known as:  CAPOTEN  
TAKE 1 TABLET BY MOUTH TWO (2) TIMES A DAY FOR BLOOD PRESSURE  
  
 hydroCHLOROthiazide 25 mg tablet Commonly known as:  HYDRODIURIL Take 1 Tab by mouth daily. metFORMIN 500 mg tablet Commonly known as:  GLUCOPHAGE Take 1 Tab by mouth three (3) times daily (with meals). metoprolol succinate 100 mg tablet Commonly known as:  TOPROL-XL  
TAKE 1 TABLET BY MOUTH DAILY FOR HEART & BLOOD PRESSURE  
  
 naproxen 500 mg tablet Commonly known as:  NAPROSYN  
TAKE 1 TABLET BY MOUTH TWO (2) TIMES DAILY (WITH MEALS). AS NEEDED FOR GOUT We Performed the Following AMB POC URINALYSIS DIP STICK AUTO W/ MICRO [26138 CPT(R)] Follow-up Instructions  Return in about 1 week (around 6/13/2018), or if symptoms worsen or fail to improve, for routine follow up. Introducing [facility]! [employer] introduces [employer] patient portal. Now you can access parts of your medical record, email your doctor's office, and request medication refills online. 1. In your internet browser, go to [email or web address] 2. Click on the First Time User? Click Here link in the Sign In box. You will see the New Member Sign Up page. 3. Enter your [employer] Access Code exactly as it appears below. You will not need to use this code after youve completed the sign-up process. If you do not sign up before the expiration date, you must request a new code. · [employer] Access Code: [email or web address] Expires: 7/17/2018  7:51 AM 
 
4. Enter the last four digits of your Social Security Number (xxxx) and Date of Birth (mm/dd/yyyy) as indicated and click Submit. You will be taken to the next sign-up page. 5. Create a [employer] ID. This will be your [employer] login ID and cannot be changed, so think of one that is secure and easy to remember. 6. Create a [employer] password. You can change your password at any time. 7. Enter your Password Reset Question and Answer. This can be used at a later time if you forget your password. 8. Enter your e-mail address. You will receive e-mail notification when new information is available in [address] 9. Click Sign Up. You can now view and download portions of your medical record. 10. Click the Download Summary menu link to download a portable copy of your medical information. If you have questions, please visit the Frequently Asked Questions section of the [employer] website. Remember, [employer] is NOT to be used for urgent needs. For medical emergencies, dial 911. Now available from your iPhone and Android! Please provide this summary of care documentation to your next provider. Your primary care clinician is listed as [facility].  If you have any questions after today's visit, please call [phone].

## 2018-06-06 NOTE — PROGRESS NOTES
Preoperative Evaluation    Date of Exam: 2018    Jamaal Thomas is a 74 y.o. male (:1945) who presents for preoperative evaluation. Has some diabetes, no hypoglycemia. Control is borderline, last A1c was 9.1. Reports poor vision. Having cataract surgery  Latex Allergy: no    Problem List:     Patient Active Problem List    Diagnosis Date Noted    Severe obesity (BMI 35.0-39. 9) with comorbidity ([facility] 2018    Type 2 diabetes mellitus with nephropathy ([facility] 2018    Type 2 diabetes mellitus with hyperglycemia, without long-term current use of insulin ([facility] 2017    Prostate cancer ([facility] 2016    Essential hypertension 2015    Gout 10/23/2013     Medical History:     Past Medical History:   Diagnosis Date    Arthritis     Cancer ([facility]     Prostate    Diabetes ([facility]     Gout     Hypertension      Allergies: Allergies   Allergen Reactions    Lipitor [Atorvastatin] Itching      Medications:     Current Outpatient Prescriptions   Medication Sig    metFORMIN (GLUCOPHAGE) 500 mg tablet Take 1 Tab by mouth three (3) times daily (with meals).  hydroCHLOROthiazide (HYDRODIURIL) 25 mg tablet Take 1 Tab by mouth daily.  captopril (CAPOTEN) 25 mg tablet TAKE 1 TABLET BY MOUTH TWO (2) TIMES A DAY FOR BLOOD PRESSURE    metoprolol succinate (TOPROL-XL) 100 mg tablet TAKE 1 TABLET BY MOUTH DAILY FOR HEART & BLOOD PRESSURE    naproxen (NAPROSYN) 500 mg tablet TAKE 1 TABLET BY MOUTH TWO (2) TIMES DAILY (WITH MEALS). AS NEEDED FOR GOUT     No current facility-administered medications for this visit.       Surgical History:     Past Surgical History:   Procedure Laterality Date    Shreya Gonzalez  2015         HX APPENDECTOMY      burst    HX ORTHOPAEDIC      toe     Social History:     Social History     Social History    Marital status:      Spouse name: N/A    Number of children: 2    Years of education: N/A     Occupational History    retired cuts grass      Social History Main Topics    Smoking status: Never Smoker    Smokeless tobacco: Former User      Comment: snuff    Alcohol use No      Comment: seldom    Drug use: No    Sexual activity: Not Currently     Other Topics Concern    None     Social History Narrative    Wife with dementia, lots of issues with money and his and her finances. Wife has hip Fx, passed away,        Anesthesia Complications: None  History of abnormal bleeding : None  History of Blood Transfusions: no  Health Care Directive or Living Will: no    Reports taking blood pressure medications without side affects. No complaints of exertional chest pain, excessive shortness of breath or focal weakness. Minimal swelling in lower legs or dizziness with standing. Objective:     Visit Vitals    /76 (BP 1 Location: Left arm, BP Patient Position: Sitting)    Pulse 67    Temp 98.2 °F (36.8 °C) (Oral)    Resp 16    Ht 5' 6\" (1.676 m)    Wt 230 lb (104.3 kg)    SpO2 98%    BMI 37.12 kg/m2     WD WN male NAD  Heart RRR without murmers clicks or rubs  Lungs CTA  Abdo soft nontender  Ext no edema        DIAGNOSTICS:   1. EKG: EKG FINDINGS -indicated     3.  Labs:   Lab Results  Component Value Date/Time   Hemoglobin A1c 9.1 (H) 04/18/2018 08:43 AM   Hemoglobin A1c 7.7 (H) 01/16/2018 08:25 AM   Hemoglobin A1c 7.7 (H) 06/15/2017 08:00 AM   Glucose 156 (H) 05/30/2018 08:55 AM   Glucose (POC) 122 (H) 01/07/2015 08:53 AM   Microalb/Creat ratio (ug/mg creat.) 133.5 (H) 04/18/2018 08:43 AM   LDL, calculated 128 (H) 06/15/2017 08:00 AM   Creatinine 1.97 (H) 05/30/2018 08:55 AM      Lab Results  Component Value Date/Time   GFR est non-AA 33 (L) 05/30/2018 08:55 AM   GFR est AA 40 (L) 05/30/2018 08:55 AM   Creatinine 1.97 (H) 05/30/2018 08:55 AM   BUN 32 (H) 05/30/2018 08:55 AM   Sodium 140 05/30/2018 08:55 AM   Potassium 3.7 05/30/2018 08:55 AM   Chloride 102 05/30/2018 08:55 AM   CO2 27 05/30/2018 08:55 AM     Lab Results   Component Value Date/Time    Glucose 156 (H) 05/30/2018 08:55 AM    Glucose (POC) 122 (H) 01/07/2015 08:53 AM        IMPRESSION:   Low risk for planned surgery  No contraindications to planned surgery      Mikey Warner MD   6/6/2018

## 2018-06-14 ENCOUNTER — OFFICE VISIT (OUTPATIENT)
Dept: INTERNAL MEDICINE CLINIC | Age: 73
End: 2018-06-14

## 2018-06-14 VITALS
SYSTOLIC BLOOD PRESSURE: 134 MMHG | HEIGHT: 66 IN | TEMPERATURE: 97.3 F | BODY MASS INDEX: 36.42 KG/M2 | DIASTOLIC BLOOD PRESSURE: 71 MMHG | HEART RATE: 56 BPM | RESPIRATION RATE: 20 BRPM | WEIGHT: 226.6 LBS | OXYGEN SATURATION: 100 %

## 2018-06-14 DIAGNOSIS — I10 ESSENTIAL HYPERTENSION: ICD-10-CM

## 2018-06-14 DIAGNOSIS — N18.30 CRI (CHRONIC RENAL INSUFFICIENCY), STAGE 3 (MODERATE) (HCC): ICD-10-CM

## 2018-06-14 DIAGNOSIS — N20.0 KIDNEY STONE: Primary | ICD-10-CM

## 2018-06-14 DIAGNOSIS — E11.65 TYPE 2 DIABETES MELLITUS WITH HYPERGLYCEMIA, WITHOUT LONG-TERM CURRENT USE OF INSULIN (HCC): ICD-10-CM

## 2018-06-14 LAB
BILIRUB UR QL STRIP: NEGATIVE
GLUCOSE UR-MCNC: NEGATIVE MG/DL
KETONES P FAST UR STRIP-MCNC: NEGATIVE MG/DL
PH UR STRIP: 5 [PH] (ref 4.6–8)
PROT UR QL STRIP: NORMAL
SP GR UR STRIP: 1.01 (ref 1–1.03)
UA UROBILINOGEN AMB POC: NORMAL (ref 0.2–1)
URINALYSIS CLARITY POC: NORMAL
URINALYSIS COLOR POC: YELLOW
URINE BLOOD POC: NORMAL
URINE LEUKOCYTES POC: NEGATIVE
URINE NITRITES POC: NEGATIVE

## 2018-06-14 RX ORDER — BLOOD SUGAR DIAGNOSTIC
STRIP MISCELLANEOUS
COMMUNITY
Start: 2018-04-18 | End: 2019-01-01 | Stop reason: ALTCHOICE

## 2018-06-14 RX ORDER — DEXAMETHASONE SODIUM PHOSPHATE 1 MG/ML
SOLUTION/ DROPS OPHTHALMIC
COMMUNITY
Start: 2018-06-04 | End: 2019-01-01 | Stop reason: ALTCHOICE

## 2018-06-14 RX ORDER — OFLOXACIN 3 MG/ML
SOLUTION/ DROPS OPHTHALMIC
COMMUNITY
Start: 2018-06-04 | End: 2018-07-16 | Stop reason: ALTCHOICE

## 2018-06-14 NOTE — PROGRESS NOTES
Chief Complaint   Patient presents with    Kidney Stone     FOLLOW UP     1. Have you been to the ER, urgent care clinic since your last visit? Hospitalized since your last visit? No    2. Have you seen or consulted any other health care providers outside of the [address] [place] [address] since your last visit? Include any pap smears or colon screening. No     Health Maintenance Due   Topic Date Due    LIPID PANEL Q1  06/15/2018     Do you have an [address] in place in the event that you have a healthcare crisis that could impact your decision making as it pertains to your health? NO    Would you like information about Advance Care Planning? NO    Information given.  NO

## 2018-06-14 NOTE — LETTER
6/19/2018 8:40 AM 
 
Mr. Sebastián NUNEZ [address] [place] [phone] Dear Sebastián Rodney: Your results are normal/stable. If not signed up, consider getting my chart to get your results on-line. We can help you to sign up. Please find your most recent results below. Resulted Orders AMB POC URINALYSIS DIP STICK AUTO W/O MICRO Result Value Ref Range Color (UA POC) Yellow Clarity (UA POC) Cloudy Glucose (UA POC) Negative Negative Bilirubin (UA POC) Negative Negative Ketones (UA POC) Negative Negative Specific gravity (UA POC) 1.015 1.001 - 1.035 Blood (UA POC) 1+ Negative Comment:  
   Small pH (UA POC) 5.0 4.6 - 8.0 Protein (UA POC) Trace Negative Urobilinogen (UA POC) 0.2 mg/dL 0.2 - 1 Nitrites (UA POC) Negative Negative Leukocyte esterase (UA POC) Negative Negative METABOLIC PANEL, BASIC Result Value Ref Range Glucose 130 (H) 65 - 99 mg/dL BUN 23 8 - 27 mg/dL Creatinine 1.74 (H) 0.76 - 1.27 mg/dL GFR est non-AA 38 (L) >59 mL/min/1.73 GFR est AA 44 (L) >59 mL/min/1.73  
 BUN/Creatinine ratio 13 10 - 24 Sodium 142 134 - 144 mmol/L Potassium 4.1 3.5 - 5.2 mmol/L Chloride 99 96 - 106 mmol/L  
 CO2 27 20 - 29 mmol/L Comment: **Please note reference interval change** Calcium 9.3 8.6 - 10.2 mg/dL Narrative Performed at:  [address]  [id number] : Luigi Richardson MD, Phone:  [phone] CKD REPORT Result Value Ref Range Interpretation Note Comment:  
   Supplemental report is available. Narrative Performed at:  [address] [place] [address]  [id number] : Ericka Coburn MD, Phone:  [phone] Please call me if you have any questions: [phone] Sincerely, Robles Irwin MD

## 2018-06-14 NOTE — PROGRESS NOTES
Subjective:     Ana Odonnell is a 74 y.o. male who presents for follow up of diabetes and hypertension. New concerns: trip over a push mower No injury, kept working no head trauma, eye surgery next week. Kidney stone pain has left. No hypos or urine freq, no dysuria. No hematuria. Cr was inc to 1.9 with last labs, min usage of naprosyn. Current Outpatient Prescriptions   Medication Sig Dispense Refill    ACCU-CHEK PHYLLIS PLUS TEST STRP strip       metFORMIN (GLUCOPHAGE) 500 mg tablet Take 1 Tab by mouth three (3) times daily (with meals). (Patient taking differently: Take 500 mg by mouth two (2) times daily (with meals). ) 270 Tab 3    hydroCHLOROthiazide (HYDRODIURIL) 25 mg tablet Take 1 Tab by mouth daily. 90 Tab 3    captopril (CAPOTEN) 25 mg tablet TAKE 1 TABLET BY MOUTH TWO (2) TIMES A DAY FOR BLOOD PRESSURE 180 Tab 3    metoprolol succinate (TOPROL-XL) 100 mg tablet TAKE 1 TABLET BY MOUTH DAILY FOR HEART & BLOOD PRESSURE 90 Tab 3    naproxen (NAPROSYN) 500 mg tablet TAKE 1 TABLET BY MOUTH TWO (2) TIMES DAILY (WITH MEALS). AS NEEDED FOR GOUT 60 Tab 5    dexamethasone (DECADRON) 0.1 % ophthalmic solution       ofloxacin (FLOXIN) 0.3 % ophthalmic solution        Allergies   Allergen Reactions    Lipitor [Atorvastatin] Itching       Diet and Lifestyle: follows a diabetic diet regularly    Cardiovascular ROS: taking medications as instructed, no medication side effects noted, no TIA's, no chest pain on exertion, no dyspnea on exertion, the usu swelling of ankles. Review of Systems, additional:  Pertinent items are noted in HPI. No urine issues currently. Patient Active Problem List    Diagnosis Date Noted    Severe obesity (BMI 35.0-39. 9) with comorbidity ([facility] 04/18/2018    Type 2 diabetes mellitus with nephropathy ([facility] 01/16/2018    Type 2 diabetes mellitus with hyperglycemia, without long-term current use of insulin ([facility] 01/30/2017    Prostate cancer ([facility] 02/23/2016    Essential hypertension 05/28/2015    Gout 10/23/2013     Social History   Substance Use Topics    Smoking status: Never Smoker    Smokeless tobacco: Former User      Comment: snuff    Alcohol use No      Comment: seldom        Lab Results  Component Value Date/Time   Hemoglobin A1c 9.1 (H) 04/18/2018 08:43 AM   Hemoglobin A1c 7.7 (H) 01/16/2018 08:25 AM   Hemoglobin A1c 7.7 (H) 06/15/2017 08:00 AM   Glucose 156 (H) 05/30/2018 08:55 AM   Glucose (POC) 122 (H) 01/07/2015 08:53 AM   Microalb/Creat ratio (ug/mg creat.) 133.5 (H) 04/18/2018 08:43 AM   LDL, calculated 128 (H) 06/15/2017 08:00 AM   Creatinine 1.97 (H) 05/30/2018 08:55 AM      Lab Results  Component Value Date/Time   GFR est non-AA 33 (L) 05/30/2018 08:55 AM   GFR est AA 40 (L) 05/30/2018 08:55 AM   Creatinine 1.97 (H) 05/30/2018 08:55 AM   BUN 32 (H) 05/30/2018 08:55 AM   Sodium 140 05/30/2018 08:55 AM   Potassium 3.7 05/30/2018 08:55 AM   Chloride 102 05/30/2018 08:55 AM   CO2 27 05/30/2018 08:55 AM     Lab Results   Component Value Date/Time    Glucose 156 (H) 05/30/2018 08:55 AM    Glucose (POC) 122 (H) 01/07/2015 08:53 AM             Objective:     Physical exam significant for the following: WNL    Visit Vitals    /71 (BP 1 Location: Left arm, BP Patient Position: Sitting)    Pulse (!) 56    Temp 97.3 °F (36.3 °C) (Oral)    Resp 20    Ht 5' 6\" (1.676 m)    Wt 226 lb 9.6 oz (102.8 kg)    SpO2 100%    BMI 36.57 kg/m2     Appearance: alert, well appearing, and in no distress. General exam: CVS exam BP noted to be well controlled today in office, S1, S2 normal, no gallop, no murmur, chest clear, no JVD, no HSM, no edema. .   Assessment/Plan:     diabetes borderline controlled, hypertension stable. Seems to have made a good recovery from  kidney stone, no symptoms currently. Falls we discussed workup. I think he just needs to be more careful. ICD-10-CM ICD-9-CM    1.  Kidney stone N20.0 592.0 AMB POC URINALYSIS DIP STICK AUTO W/O MICRO   2. CRI (chronic renal insufficiency), stage 3 (moderate) L26.8 776.4 METABOLIC PANEL, BASIC   3. Type 2 diabetes mellitus with hyperglycemia, without long-term current use of insulin (HCC) E11.65 250.00      790.29    4. Essential hypertension I10 401.9      Orders Placed This Encounter    METABOLIC PANEL, BASIC    AMB POC URINALYSIS DIP STICK AUTO W/O MICRO    ACCU-CHEK PHYLLIS PLUS TEST STRP strip    dexamethasone (DECADRON) 0.1 % ophthalmic solution    ofloxacin (FLOXIN) 0.3 % ophthalmic solution     Follow-up Disposition:  Return in about 3 months (around 9/14/2018) for routine follow up.

## 2018-06-15 LAB
BUN SERPL-MCNC: 23 MG/DL (ref 8–27)
BUN/CREAT SERPL: 13 (ref 10–24)
CALCIUM SERPL-MCNC: 9.3 MG/DL (ref 8.6–10.2)
CHLORIDE SERPL-SCNC: 99 MMOL/L (ref 96–106)
CO2 SERPL-SCNC: 27 MMOL/L (ref 20–29)
CREAT SERPL-MCNC: 1.74 MG/DL (ref 0.76–1.27)
GFR SERPLBLD CREATININE-BSD FMLA CKD-EPI: 38 ML/MIN/1.73
GFR SERPLBLD CREATININE-BSD FMLA CKD-EPI: 44 ML/MIN/1.73
GLUCOSE SERPL-MCNC: 130 MG/DL (ref 65–99)
INTERPRETATION: NORMAL
POTASSIUM SERPL-SCNC: 4.1 MMOL/L (ref 3.5–5.2)
SODIUM SERPL-SCNC: 142 MMOL/L (ref 134–144)

## 2018-06-22 ENCOUNTER — TELEPHONE (OUTPATIENT)
Dept: INTERNAL MEDICINE CLINIC | Age: 73
End: 2018-06-22

## 2018-06-22 NOTE — TELEPHONE ENCOUNTER
Called [facility]) and informed her that a verbal cannot be given awaiting Dr. Humberto Owens to return. Spoke with the patient and he confirmed that he has spoken with [facility].

## 2018-06-22 NOTE — TELEPHONE ENCOUNTER
----- Message from Hector Laughlin Page sent at 6/21/2018  5:55 PM EDT -----  Regarding: Dr. Evonne Holliday, [facility], is requesting a return call, regarding verbal approval.  Best contact number [phone] ext [phone].

## 2018-06-25 NOTE — TELEPHONE ENCOUNTER
Confirmed with Edi Aguilar at pharmacy that patient checks his blood sugar once daily - ICD 10 for his diabetes E11.65 - they will process diabetic testing supplies X 90 days X 3 refills and send them out to patient  Chivo Donato LPN  4/57/5583  0:56 PM

## 2018-07-16 ENCOUNTER — OFFICE VISIT (OUTPATIENT)
Dept: INTERNAL MEDICINE CLINIC | Age: 73
End: 2018-07-16

## 2018-07-16 VITALS
HEIGHT: 66 IN | TEMPERATURE: 96.9 F | WEIGHT: 225 LBS | HEART RATE: 55 BPM | BODY MASS INDEX: 36.16 KG/M2 | DIASTOLIC BLOOD PRESSURE: 62 MMHG | RESPIRATION RATE: 16 BRPM | OXYGEN SATURATION: 98 % | SYSTOLIC BLOOD PRESSURE: 116 MMHG

## 2018-07-16 DIAGNOSIS — M10.00 IDIOPATHIC GOUT, UNSPECIFIED CHRONICITY, UNSPECIFIED SITE: ICD-10-CM

## 2018-07-16 DIAGNOSIS — I10 ESSENTIAL HYPERTENSION: ICD-10-CM

## 2018-07-16 DIAGNOSIS — Z00.00 MEDICARE ANNUAL WELLNESS VISIT, SUBSEQUENT: Primary | ICD-10-CM

## 2018-07-16 DIAGNOSIS — E11.65 TYPE 2 DIABETES MELLITUS WITH HYPERGLYCEMIA, WITHOUT LONG-TERM CURRENT USE OF INSULIN (HCC): ICD-10-CM

## 2018-07-16 DIAGNOSIS — Z13.220 SCREENING CHOLESTEROL LEVEL: ICD-10-CM

## 2018-07-16 DIAGNOSIS — Z13.39 SCREENING FOR ALCOHOLISM: ICD-10-CM

## 2018-07-16 DIAGNOSIS — C61 PROSTATE CANCER (HCC): ICD-10-CM

## 2018-07-16 DIAGNOSIS — Z13.31 SCREENING FOR DEPRESSION: ICD-10-CM

## 2018-07-16 NOTE — ASSESSMENT & PLAN NOTE
Key Oncology Meds     Patient is on no Oncologic meds. Key Pain Meds             naproxen (NAPROSYN) 500 mg tablet  (Taking) TAKE 1 TABLET BY MOUTH TWO (2) TIMES DAILY (WITH MEALS). AS NEEDED FOR GOUT        Lab Results   Component Value Date/Time    WBC 8.7 05/30/2018 08:55 AM    ABS.  NEUTROPHILS 7.2 05/30/2018 08:55 AM    HGB 12.0 05/30/2018 08:55 AM    HCT 37.7 05/30/2018 08:55 AM    PLATELET 829 98/32/7196 08:55 AM    Creatinine 1.74 06/14/2018 09:18 AM    BUN 23 06/14/2018 09:18 AM    ALT (SGPT) 20 05/30/2018 08:55 AM    AST (SGOT) 31 05/30/2018 08:55 AM    Albumin 3.8 05/30/2018 08:55 AM

## 2018-07-16 NOTE — PATIENT INSTRUCTIONS
Medicare Wellness Visit, Male    The best way to live healthy is to have a lifestyle where you eat a well-balanced diet, exercise regularly, limit alcohol use, and quit all forms of tobacco/nicotine, if applicable. Regular preventive services are another way to keep healthy. Preventive services (vaccines, screening tests, monitoring & exams) can help personalize your care plan, which helps you manage your own care. Screening tests can find health problems at the earliest stages, when they are easiest to treat. [address] [facility] follows the current, evidence-based guidelines published by the [facility] ([facility]) when recommending preventive services for our patients. Because we follow these guidelines, sometimes recommendations change over time as research supports it. (For example, a prostate screening blood test is no longer routinely recommended for men with no symptoms.)    Of course, you and your provider may decide to screen more often for some diseases, based on your risk and co-morbidities (chronic disease you are already diagnosed with). Preventive services for you include:    - Medicare offers their members a free annual wellness visit, which is time for you and your primary care provider to discuss and plan for your preventive service needs. Take advantage of this benefit every year!    -All people over age 72 should receive the recommended pneumonia vaccines. Current USPSTF guidelines recommend a series of two vaccines for the best pneumonia protection.     -All adults should have a yearly flu vaccine and a tetanus vaccine every 10 years.  All adults age 61 years should receive a shingles vaccine once in their lifetime.      -All adults age 38-68 years who are overweight should have a diabetes screening test once every three years.     -Other screening tests & preventive services for persons with diabetes include: an eye exam to screen for diabetic retinopathy, a kidney function test, a foot exam, and stricter control over your cholesterol.     -Cardiovascular screening for adults with routine risk involves an electrocardiogram (ECG) at intervals determined by the provider.     -Colorectal cancer screenings should be done for adults age 54-65 years with normal risk. There are a number of acceptable methods of screening for this type of cancer. Each test has its own benefits and drawbacks. Discuss with your provider what is most appropriate for you during your annual wellness visit. The different tests include: colonoscopy (considered the best screening method), a fecal occult blood test, a fecal DNA test, and sigmoidoscopy.    -All adults born between [facility] should be screened once for Hepatitis C.    -An Abdominal Aortic Aneurysm (AAA) Screening is recommended for men age 73-68 who has ever smoked in their lifetime. Here is a list of your current Health Maintenance items (your personalized list of preventive services) with a due date:  Health Maintenance Due   Topic Date Due    Cholesterol Test   06/15/2018    Annual Well Visit  06/16/2018        Learning About Meal Planning for Diabetes  Why plan your meals? Meal planning can be a key part of managing diabetes. Planning meals and snacks with the right balance of carbohydrate, protein, and fat can help you keep your blood sugar at the target level you set with your doctor. You don't have to eat special foods. You can eat what your family eats, including sweets once in a while. But you do have to pay attention to how often you eat and how much you eat of certain foods. You may want to work with a dietitian or a certified diabetes educator. He or she can give you tips and meal ideas and can answer your questions about meal planning. This health professional can also help you reach a healthy weight if that is one of your goals. What plan is right for you?   Your dietitian or diabetes educator may suggest that you start with the plate format or carbohydrate counting. The plate format  The plate format is a simple way to help you manage how you eat. You plan meals by learning how much space each food should take on a plate. Using the plate format helps you spread carbohydrate throughout the day. It can make it easier to keep your blood sugar level within your target range. It also helps you see if you're eating healthy portion sizes. To use the plate format, you put non-starchy vegetables on half your plate. Add meat or meat substitutes on one-quarter of the plate. Put a grain or starchy vegetable (such as brown rice or a potato) on the final quarter of the plate. You can add a small piece of fruit and some low-fat or fat-free milk or yogurt, depending on your carbohydrate goal for each meal.  Here are some tips for using the plate format:  · Make sure that you are not using an oversized plate. A 9-inch plate is best. Many restaurants use larger plates. · Get used to using the plate format at home. Then you can use it when you eat out. · Write down your questions about using the plate format. Talk to your doctor, a dietitian, or a diabetes educator about your concerns. Carbohydrate counting  With carbohydrate counting, you plan meals based on the amount of carbohydrate in each food. Carbohydrate raises blood sugar higher and more quickly than any other nutrient. It is found in desserts, breads and cereals, and fruit. It's also found in starchy vegetables such as potatoes and corn, grains such as rice and pasta, and milk and yogurt. Spreading carbohydrate throughout the day helps keep your blood sugar levels within your target range. Your daily amount depends on several things, including your weight, how active you are, which diabetes medicines you take, and what your goals are for your blood sugar levels.  A registered dietitian or diabetes educator can help you plan how much carbohydrate to include in each meal and snack. A guideline for your daily amount of carbohydrate is:  · 45 to 60 grams at each meal. That's about the same as 3 to 4 carbohydrate servings. · 15 to 20 grams at each snack. That's about the same as 1 carbohydrate serving. The Nutrition Facts label on packaged foods tells you how much carbohydrate is in a serving of the food. First, look at the serving size on the food label. Is that the amount you eat in a serving? All of the nutrition information on a food label is based on that serving size. So if you eat more or less than that, you'll need to adjust the other numbers. Total carbohydrate is the next thing you need to look for on the label. If you count carbohydrate servings, one serving of carbohydrate is 15 grams. For foods that don't come with labels, such as fresh fruits and vegetables, you'll need a guide that lists carbohydrate in these foods. Ask your doctor, dietitian, or diabetes educator about books or other nutrition guides you can use. If you take insulin, you need to know how many grams of carbohydrate are in a meal. This lets you know how much rapid-acting insulin to take before you eat. If you use an insulin pump, you get a constant rate of insulin during the day. So the pump must be programmed at meals to give you extra insulin to cover the rise in blood sugar after meals. When you know how much carbohydrate you will eat, you can take the right amount of insulin. Or, if you always use the same amount of insulin, you need to make sure that you eat the same amount of carbohydrate at meals. If you need more help to understand carbohydrate counting and food labels, ask your doctor, dietitian, or diabetes educator. How do you get started with meal planning? Here are some tips to get started:  · Plan your meals a week at a time. Don't forget to include snacks too. · Use cookbooks or online recipes to plan several main meals. Plan some quick meals for busy nights.  You also can double some recipes that freeze well. Then you can save half for other busy nights when you don't have time to cook. · Make sure you have the ingredients you need for your recipes. If you're running low on basic items, put these items on your shopping list too. · List foods that you use to make breakfasts, lunches, and snacks. List plenty of fruits and vegetables. · Post this list on the refrigerator. Add to it as you think of more things you need. · Take the list to the store to do your weekly shopping. Follow-up care is a key part of your treatment and safety. Be sure to make and go to all appointments, and call your doctor if you are having problems. It's also a good idea to know your test results and keep a list of the medicines you take. Where can you learn more? Go to http://parth-sean.info/. Anayeli Branch in the search box to learn more about \"Learning About Meal Planning for Diabetes. \"  Current as of: December 7, 2017  Content Version: 11.7  © 7632-5400 [employer]. Care instructions adapted under license by [employer] (which disclaims liability or warranty for this information). If you have questions about a medical condition or this instruction, always ask your healthcare professional. [address] any warranty or liability for your use of this information.

## 2018-07-16 NOTE — MR AVS SNAPSHOT
[address] 6160 [facility] 
[phone] Patient: Jamaal Barrera MRN: [id number] Visit Information Date & Time Provider Department Dept. Phone Encounter #  
 2018  8:15 AM MD Lexis Olivo Dr [id number] Follow-up Instructions Return in about 4 months (around 2018) for routine follow up. Your Appointments 2018  8:45 AM  
ROUTINE CARE with MD Yolanda Oliv[address] ([facility]) Appt Note: f/u on dm /[address] [place] [id number]  
[phone]  
  
   
 [address] [place] [address] [address] Upcoming Health Maintenance Date Due  
 LIPID PANEL Q1 6/15/2018 MEDICARE YEARLY EXAM 2018 Influenza Age 5 to Adult 2018 HEMOGLOBIN A1C Q6M 10/18/2018 FOOT EXAM Q1 2019 MICROALBUMIN Q1 2019 EYE EXAM RETINAL OR DILATED Q1 2019 GLAUCOMA SCREENING Q2Y 2020 COLONOSCOPY 2025 DTaP/Tdap/Td series (2 - Td) 6/15/2027 Allergies as of 2018  Review Complete On: 2018 By: Mary Dumont MD  
  
 Severity Noted Reaction Type Reactions Lipitor [Atorvastatin]  10/23/2013    Itching Current Immunizations  Reviewed on 2018 Name Date Influenza High Dose Vaccine PF 10/16/2017, 2016 Influenza Vaccine 10/23/2013 Influenza Vaccine Jose Goring) 2014 Influenza Vaccine (Quad) PF 10/28/2015 Pneumococcal Conjugate (PCV-13) 2016 Pneumococcal Polysaccharide (PPSV-23) 2014 Tdap 6/15/2017 Not reviewed this visit You Were Diagnosed With   
  
 Codes Comments Medicare annual wellness visit, subsequent    -  Primary ICD-10-CM: Z00.00 ICD-9-CM: V70.0 Prostate cancer [facility])     ICD-10-CM: P00 ICD-9-CM: 693 Type 2 diabetes mellitus with hyperglycemia, without long-term current use of insulin (HCC)     ICD-10-CM: E11.65 ICD-9-CM: 250.00, 790.29 Essential hypertension     ICD-10-CM: I10 
ICD-9-CM: 401.9 Idiopathic gout, unspecified chronicity, unspecified site     ICD-10-CM: M10.00 ICD-9-CM: 274.9 Screening cholesterol level     ICD-10-CM: N29.321 ICD-9-CM: V77.91 Screening for alcoholism     ICD-10-CM: Z13.89 ICD-9-CM: V79.1 Screening for depression     ICD-10-CM: Z13.89 ICD-9-CM: V79.0 Vitals BP Pulse Temp Resp Height(growth percentile) Weight(growth percentile) 116/62 (!) 55 96.9 °F (36.1 °C) 16 5' 6\" (1.676 m) 225 lb (102.1 kg) SpO2 BMI Smoking Status 98% 36.32 kg/m2 Never Smoker Vitals History BMI and BSA Data Body Mass Index Body Surface Area  
 36.32 kg/m 2 2.18 m 2 Preferred Pharmacy Pharmacy Name Phone [facility], [address] [phone] Your Updated Medication List  
  
   
This list is accurate as of 7/16/18  8:56 AM.  Always use your most recent med list.  
  
  
  
  
 ACCU-CHEK PHYLLIS PLUS TEST STRP strip Generic drug:  glucose blood VI test strips  
  
 captopril 25 mg tablet Commonly known as:  CAPOTEN  
TAKE 1 TABLET BY MOUTH TWO (2) TIMES A DAY FOR BLOOD PRESSURE  
  
 dexamethasone 0.1 % ophthalmic solution Commonly known as:  DECADRON  
  
 hydroCHLOROthiazide 25 mg tablet Commonly known as:  HYDRODIURIL Take 1 Tab by mouth daily. metFORMIN 500 mg tablet Commonly known as:  GLUCOPHAGE Take 1 Tab by mouth three (3) times daily (with meals). metoprolol succinate 100 mg tablet Commonly known as:  TOPROL-XL  
TAKE 1 TABLET BY MOUTH DAILY FOR HEART & BLOOD PRESSURE  
  
 naproxen 500 mg tablet Commonly known as:  NAPROSYN  
TAKE 1 TABLET BY MOUTH TWO (2) TIMES DAILY (WITH MEALS).  AS NEEDED FOR GOUT  
  
  
  
  
 We Performed the Following [facility] 68 [[id number] [facility]] HEMOGLOBIN A1C WITH EAG [59550 CPT(R)] LIPID PANEL [37163 CPT(R)] METABOLIC PANEL, BASIC [09677 CPT(R)] MS ANNUAL ALCOHOL SCREEN 15 MIN [id number] [facility]] Follow-up Instructions Return in about 4 months (around 11/16/2018) for routine follow up. Patient Instructions Medicare Wellness Visit, Male The best way to live healthy is to have a lifestyle where you eat a well-balanced diet, exercise regularly, limit alcohol use, and quit all forms of tobacco/nicotine, if applicable. Regular preventive services are another way to keep healthy. Preventive services (vaccines, screening tests, monitoring & exams) can help personalize your care plan, which helps you manage your own care. Screening tests can find health problems at the earliest stages, when they are easiest to treat. Leeanne Stephania Salgado follows the current, evidence-based guidelines published by the [facility] States Micah Madison ([facility]) when recommending preventive services for our patients. Because we follow these guidelines, sometimes recommendations change over time as research supports it. (For example, a prostate screening blood test is no longer routinely recommended for men with no symptoms.) Of course, you and your provider may decide to screen more often for some diseases, based on your risk and co-morbidities (chronic disease you are already diagnosed with). Preventive services for you include: - Medicare offers their members a free annual wellness visit, which is time for you and your primary care provider to discuss and plan for your preventive service needs. Take advantage of this benefit every year! 
 
-All people over age 72 should receive the recommended pneumonia vaccines. Current USPSTF guidelines recommend a series of two vaccines for the best pneumonia protection. -All adults should have a yearly flu vaccine and a tetanus vaccine every 10 years. All adults age 61 years should receive a shingles vaccine once in their lifetime.   
 
-All adults age 38-68 years who are overweight should have a diabetes screening test once every three years.  
 
-Other screening tests & preventive services for persons with diabetes include: an eye exam to screen for diabetic retinopathy, a kidney function test, a foot exam, and stricter control over your cholesterol.  
 
-Cardiovascular screening for adults with routine risk involves an electrocardiogram (ECG) at intervals determined by the provider.  
 
-Colorectal cancer screenings should be done for adults age 54-65 years with normal risk. There are a number of acceptable methods of screening for this type of cancer. Each test has its own benefits and drawbacks. Discuss with your provider what is most appropriate for you during your annual wellness visit. The different tests include: colonoscopy (considered the best screening method), a fecal occult blood test, a fecal DNA test, and sigmoidoscopy. 
 
-All adults born between [facility] should be screened once for Hepatitis C. 
 
-An Abdominal Aortic Aneurysm (AAA) Screening is recommended for men age 73-68 who has ever smoked in their lifetime. Here is a list of your current Health Maintenance items (your personalized list of preventive services) with a due date: 
Health Maintenance Due Topic Date Due  Cholesterol Test   06/15/2018 [address] [place] [address] Annual Well Visit  06/16/2018 Learning About Meal Planning for Diabetes Why plan your meals? Meal planning can be a key part of managing diabetes. Planning meals and snacks with the right balance of carbohydrate, protein, and fat can help you keep your blood sugar at the target level you set with your doctor. You don't have to eat special foods.  You can eat what your family eats, including sweets once in a while. But you do have to pay attention to how often you eat and how much you eat of certain foods. You may want to work with a dietitian or a certified diabetes educator. He or she can give you tips and meal ideas and can answer your questions about meal planning. This health professional can also help you reach a healthy weight if that is one of your goals. What plan is right for you? Your dietitian or diabetes educator may suggest that you start with the plate format or carbohydrate counting. The plate format The plate format is a simple way to help you manage how you eat. You plan meals by learning how much space each food should take on a plate. Using the plate format helps you spread carbohydrate throughout the day. It can make it easier to keep your blood sugar level within your target range. It also helps you see if you're eating healthy portion sizes. To use the plate format, you put non-starchy vegetables on half your plate. Add meat or meat substitutes on one-quarter of the plate. Put a grain or starchy vegetable (such as brown rice or a potato) on the final quarter of the plate. You can add a small piece of fruit and some low-fat or fat-free milk or yogurt, depending on your carbohydrate goal for each meal. 
Here are some tips for using the plate format: · Make sure that you are not using an oversized plate. A 9-inch plate is best. Many restaurants use larger plates. · Get used to using the plate format at home. Then you can use it when you eat out. · Write down your questions about using the plate format. Talk to your doctor, a dietitian, or a diabetes educator about your concerns. Carbohydrate counting With carbohydrate counting, you plan meals based on the amount of carbohydrate in each food. Carbohydrate raises blood sugar higher and more quickly than any other nutrient.  It is found in desserts, breads and cereals, and fruit. It's also found in starchy vegetables such as potatoes and corn, grains such as rice and pasta, and milk and yogurt. Spreading carbohydrate throughout the day helps keep your blood sugar levels within your target range. Your daily amount depends on several things, including your weight, how active you are, which diabetes medicines you take, and what your goals are for your blood sugar levels. A registered dietitian or diabetes educator can help you plan how much carbohydrate to include in each meal and snack. A guideline for your daily amount of carbohydrate is: · 45 to 60 grams at each meal. That's about the same as 3 to 4 carbohydrate servings. · 15 to 20 grams at each snack. That's about the same as 1 carbohydrate serving. The Nutrition Facts label on packaged foods tells you how much carbohydrate is in a serving of the food. First, look at the serving size on the food label. Is that the amount you eat in a serving? All of the nutrition information on a food label is based on that serving size. So if you eat more or less than that, you'll need to adjust the other numbers. Total carbohydrate is the next thing you need to look for on the label. If you count carbohydrate servings, one serving of carbohydrate is 15 grams. For foods that don't come with labels, such as fresh fruits and vegetables, you'll need a guide that lists carbohydrate in these foods. Ask your doctor, dietitian, or diabetes educator about books or other nutrition guides you can use. If you take insulin, you need to know how many grams of carbohydrate are in a meal. This lets you know how much rapid-acting insulin to take before you eat. If you use an insulin pump, you get a constant rate of insulin during the day. So the pump must be programmed at meals to give you extra insulin to cover the rise in blood sugar after meals.  
When you know how much carbohydrate you will eat, you can take the right amount of insulin. Or, if you always use the same amount of insulin, you need to make sure that you eat the same amount of carbohydrate at meals. If you need more help to understand carbohydrate counting and food labels, ask your doctor, dietitian, or diabetes educator. How do you get started with meal planning? Here are some tips to get started: 
· Plan your meals a week at a time. Don't forget to include snacks too. · Use cookbooks or online recipes to plan several main meals. Plan some quick meals for busy nights. You also can double some recipes that freeze well. Then you can save half for other busy nights when you don't have time to cook. · Make sure you have the ingredients you need for your recipes. If you're running low on basic items, put these items on your shopping list too. · List foods that you use to make breakfasts, lunches, and snacks. List plenty of fruits and vegetables. · Post this list on the refrigerator. Add to it as you think of more things you need. · Take the list to the store to do your weekly shopping. Follow-up care is a key part of your treatment and safety. Be sure to make and go to all appointments, and call your doctor if you are having problems. It's also a good idea to know your test results and keep a list of the medicines you take. Where can you learn more? Go to http://parth-sean.info/. Vanessa Chaudhry in the search box to learn more about \"Learning About Meal Planning for Diabetes. \" Current as of: December 7, 2017 Content Version: 11.7 © 7423-6712 [employer]. Care instructions adapted under license by [employer] (which disclaims liability or warranty for this information). If you have questions about a medical condition or this instruction, always ask your healthcare professional. [address] any warranty or liability for your use of this information. Introducing [facility]! [employer] introduces [facility] patient portal. Now you can access parts of your medical record, email your doctor's office, and request medication refills online. 1. In your internet browser, go to [email or web address] [place] 2. Click on the First Time User? Click Here link in the Sign In box. You will see the New Member Sign Up page. 3. Enter your [facility] Access Code exactly as it appears below. You will not need to use this code after youve completed the sign-up process. If you do not sign up before the expiration date, you must request a new code. · [facility] Access Code: [facility] Expires: 7/17/2018  7:51 AM 
 
4. Enter the last four digits of your Social Security Number (xxxx) and Date of Birth (mm/dd/yyyy) as indicated and click Submit. You will be taken to the next sign-up page. 5. Create a [facility] ID. This will be your [facility] login ID and cannot be changed, so think of one that is secure and easy to remember. 6. Create a [facility] password. You can change your password at any time. 7. Enter your Password Reset Question and Answer. This can be used at a later time if you forget your password. 8. Enter your e-mail address. You will receive e-mail notification when new information is available in [address] 9. Click Sign Up. You can now view and download portions of your medical record. 10. Click the Download Summary menu link to download a portable copy of your medical information. If you have questions, please visit the Frequently Asked Questions section of the [facility] website. Remember, [facility] is NOT to be used for urgent needs. For medical emergencies, dial 911. Now available from your iPhone and Android! Please provide this summary of care documentation to your next provider. Your primary care clinician is listed as Lidia Suazo. If you have any questions after today's visit, please call [phone].

## 2018-07-16 NOTE — PROGRESS NOTES
This is the Subsequent Medicare Annual Wellness Exam, performed 12 months or more after the Initial AWV or the last Subsequent AWV    I have reviewed the patient's medical history in detail and updated the computerized patient record. History     Here for wellness. Sees urology matthew bishop. Had seed implantation  Min gout. 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 Stays busy since his wife passed away. Reports taking blood pressure medications without side affects. No complaints of exertional chest pain, excessive shortness of breath or focal weakness. Minimal swelling in lower legs or dizziness with standing. Past Medical History:   Diagnosis Date    Arthritis     Cancer ([facility]     Prostate    Diabetes ([facility]     Gout     Hypertension     Kidney stone       Past Surgical History:   Procedure Laterality Date    Robbie Colóner  1/7/2015         HX APPENDECTOMY      burst    HX CATARACT REMOVAL Left 07/01/2018    HX ORTHOPAEDIC      toe     Current Outpatient Prescriptions   Medication Sig Dispense Refill    ACCU-CHEK PHYLLIS PLUS TEST STRP strip       dexamethasone (DECADRON) 0.1 % ophthalmic solution       ofloxacin (FLOXIN) 0.3 % ophthalmic solution       metFORMIN (GLUCOPHAGE) 500 mg tablet Take 1 Tab by mouth three (3) times daily (with meals). (Patient taking differently: Take 500 mg by mouth two (2) times daily (with meals). ) 270 Tab 3    hydroCHLOROthiazide (HYDRODIURIL) 25 mg tablet Take 1 Tab by mouth daily. 90 Tab 3    captopril (CAPOTEN) 25 mg tablet TAKE 1 TABLET BY MOUTH TWO (2) TIMES A DAY FOR BLOOD PRESSURE 180 Tab 3    metoprolol succinate (TOPROL-XL) 100 mg tablet TAKE 1 TABLET BY MOUTH DAILY FOR HEART & BLOOD PRESSURE 90 Tab 3    naproxen (NAPROSYN) 500 mg tablet TAKE 1 TABLET BY MOUTH TWO (2) TIMES DAILY (WITH MEALS).  AS NEEDED FOR GOUT 60 Tab 5     Allergies   Allergen Reactions    Lipitor [Atorvastatin] Itching     History reviewed. No pertinent family history. Social History   Substance Use Topics    Smoking status: Never Smoker    Smokeless tobacco: Former User      Comment: snuff    Alcohol use No      Comment: seldom     Patient Active Problem List   Diagnosis Code    Gout M10.9    Essential hypertension I10    Prostate cancer ([facility] C61    Type 2 diabetes mellitus with hyperglycemia, without long-term current use of insulin (HCC) E11.65    Type 2 diabetes mellitus with nephropathy ([facility] E11.21    Severe obesity (BMI 35.0-39. 9) with comorbidity ([facility] E66.01       Depression Risk Factor Screening:     PHQ over the last two weeks 7/16/2018   Little interest or pleasure in doing things Several days   Feeling down, depressed or hopeless Several days   Total Score PHQ 2 2     Alcohol Risk Factor Screening: You do not drink alcohol or very rarely. Functional Ability and Level of Safety:   Hearing Loss  Hearing is good. Activities of Daily Living  The home contains: handrails and grab bars  Patient does total self care    Fall Risk  Fall Risk Assessment, last 12 mths 6/14/2018   Able to walk? Yes   Fall in past 12 months? No       Abuse Screen  Patient is not abused    Cognitive Screening   Evaluation of Cognitive Function:  Has your family/caregiver stated any concerns about your memory: no  Normal    Patient Care Team   Patient Care Team:  Sosa Ramires MD as PCP - [facility])  Urology [facility]  Eye [facility]    Visit Vitals    /62    Pulse (!) 55    Temp 96.9 °F (36.1 °C)    Resp 16    Ht 5' 6\" (1.676 m)    Wt 225 lb (102.1 kg)    SpO2 98%    BMI 36.32 kg/m2     WD WN male NAD  Heart RRR without murmers clicks or rubs  Lungs CTA  Abdo soft nontender  Ext no edema    Assessment/Plan   Education and counseling provided:  Cardiovascular screening blood test  Medical nutrition therapy for individuals with diabetes or renal disease      ICD-10-CM ICD-9-CM    1.  Medicare annual wellness visit, subsequent Z00.00 V70.0    2. Prostate cancer ([facility] C61 185    3. Type 2 diabetes mellitus with hyperglycemia, without long-term current use of insulin ([facility]) E11.65 250.00 HEMOGLOBIN A1C WITH EAG     350.07 METABOLIC PANEL, BASIC      CANCELED: AMB POC HEMOGLOBIN A1C   4. Essential hypertension I10 401.9    5. Idiopathic gout, unspecified chronicity, unspecified site M10.00 274.9    6. Screening cholesterol level Z13.220 V77.91 LIPID PANEL   7. Screening for alcoholism Z13.89 V79.1 UT ANNUAL ALCOHOL SCREEN 15 MIN   8. Screening for depression Z13.89 V79.0 DEPRESSION SCREEN ANNUAL     Chronic Conditions Addressed Today     1. Gout    2. Essential hypertension    3. Prostate cancer [facility])     Overview      Seed implantation 2016 Luis F          Current Assessment & Plan        Key Oncology Meds     Patient is on no Oncologic meds. Key Pain Meds             naproxen (NAPROSYN) 500 mg tablet  (Taking) TAKE 1 TABLET BY MOUTH TWO (2) TIMES DAILY (WITH MEALS). AS NEEDED FOR GOUT        Lab Results   Component Value Date/Time    WBC 8.7 05/30/2018 08:55 AM    ABS.  NEUTROPHILS 7.2 05/30/2018 08:55 AM    HGB 12.0 05/30/2018 08:55 AM    HCT 37.7 05/30/2018 08:55 AM    PLATELET 762 20/54/9369 08:55 AM    Creatinine 1.74 06/14/2018 09:18 AM    BUN 23 06/14/2018 09:18 AM    ALT (SGPT) 20 05/30/2018 08:55 AM    AST (SGOT) 31 05/30/2018 08:55 AM    Albumin 3.8 05/30/2018 08:55 AM            4. Type 2 diabetes mellitus with hyperglycemia, without long-term current use of insulin ([facility]     Relevant Orders     HEMOGLOBIN A1C WITH EAG (Completed)     METABOLIC PANEL, BASIC (Completed)      Acute Diagnoses Addressed Today     Medicare annual wellness visit, subsequent    -  Primary    Screening cholesterol level            Relevant Orders        LIPID PANEL (Completed)    Screening for alcoholism            Relevant Orders        UT ANNUAL ALCOHOL SCREEN 15 MIN    Screening for depression            Relevant Orders DEPRESSION SCREEN ANNUAL        Orders Placed This Encounter    Depression Screen Annual    LIPID PANEL    HEMOGLOBIN A1C WITH EAG    METABOLIC PANEL, BASIC    CVD REPORT    CKD REPORT    DIABETES PATIENT EDUCATION    Annual  Alcohol Screen 15 min ()         Health Maintenance Due   Topic Date Due    LIPID PANEL Q1  06/15/2018    MEDICARE YEARLY EXAM  06/16/2018     Prostate cancer stable, diabetes stable, hypertension good control. Follow-up Disposition:  Return in about 4 months (around 11/16/2018) for routine follow up.

## 2018-07-16 NOTE — PROGRESS NOTES
Chief Complaint   Patient presents with    Kidney Stone     follow up     I have reviewed the patient's medical history in detail and updated the computerized patient record. Health Maintenance reviewed. 1. Have you been to the ER, urgent care clinic since your last visit? Hospitalized since your last visit?no    2. Have you seen or consulted any other health care providers outside of the [address] [place] [address] Damian since your last visit? Include any pap smears or colon screening. No    Encouraged pt to discuss pt's wishes with spouse/partner/family and bring them in the next appt to follow thru with the Advanced Directive    Fall Risk Assessment, last 12 mths 6/14/2018   Able to walk? Yes   Fall in past 12 months? No       PHQ over the last two weeks 7/16/2018   Little interest or pleasure in doing things Several days   Feeling down, depressed or hopeless Several days   Total Score PHQ 2 2       Abuse Screening Questionnaire 1/16/2018   Do you ever feel afraid of your partner? N   Are you in a relationship with someone who physically or mentally threatens you? N   Is it safe for you to go home?  Y       ADL Assessment 1/16/2018   Feeding yourself No Help Needed   Getting from bed to chair No Help Needed   Getting dressed No Help Needed   Bathing or showering No Help Needed   Walk across the room (includes cane/walker) No Help Needed   Using the telphone No Help Needed   Taking your medications No Help Needed   Preparing meals No Help Needed   Managing money (expenses/bills) No Help Needed   Moderately strenuous housework (laundry) No Help Needed   Shopping for personal items (toiletries/medicines) No Help Needed   Shopping for groceries No Help Needed   Driving No Help Needed   Climbing a flight of stairs No Help Needed   Getting to places beyond walking distances No Help Needed

## 2018-07-17 ENCOUNTER — TELEPHONE (OUTPATIENT)
Dept: INTERNAL MEDICINE CLINIC | Age: 73
End: 2018-07-17

## 2018-07-17 LAB
BUN SERPL-MCNC: 22 MG/DL (ref 8–27)
BUN/CREAT SERPL: 16 (ref 10–24)
CALCIUM SERPL-MCNC: 8.8 MG/DL (ref 8.6–10.2)
CHLORIDE SERPL-SCNC: 99 MMOL/L (ref 96–106)
CHOLEST SERPL-MCNC: 184 MG/DL (ref 100–199)
CO2 SERPL-SCNC: 23 MMOL/L (ref 20–29)
CREAT SERPL-MCNC: 1.38 MG/DL (ref 0.76–1.27)
EST. AVERAGE GLUCOSE BLD GHB EST-MCNC: 166 MG/DL
GLUCOSE SERPL-MCNC: 117 MG/DL (ref 65–99)
HBA1C MFR BLD: 7.4 % (ref 4.8–5.6)
HDLC SERPL-MCNC: 44 MG/DL
INTERPRETATION, 910389: NORMAL
INTERPRETATION: NORMAL
LDLC SERPL CALC-MCNC: 124 MG/DL (ref 0–99)
Lab: NORMAL
PDF IMAGE, 910387: NORMAL
POTASSIUM SERPL-SCNC: 3.9 MMOL/L (ref 3.5–5.2)
SODIUM SERPL-SCNC: 140 MMOL/L (ref 134–144)
TRIGL SERPL-MCNC: 78 MG/DL (ref 0–149)
VLDLC SERPL CALC-MCNC: 16 MG/DL (ref 5–40)

## 2018-07-17 NOTE — TELEPHONE ENCOUNTER
Chief Complaint   Patient presents with    Results     LIPID, HGA1C, BMP     1st attempt--  Memory full.

## 2018-07-17 NOTE — TELEPHONE ENCOUNTER
----- Message from Magdiel Jesus MD sent at 7/17/2018 10:52 AM EDT -----  Your diabetes is not as good as we like but it's better. Please continue your current medications and vigorously follow your diabetic diet. No change in your medications for now. Send normal/stable results letter. Your results are normal/stable. If not signed up, consider getting my chart to get your results on-line. We can help you to sign up.

## 2018-07-17 NOTE — PROGRESS NOTES
Your diabetes is not as good as we like but it's better. Please continue your current medications and vigorously follow your diabetic diet. No change in your medications for now. Send normal/stable results letter. Your results are normal/stable. If not signed up, consider getting my chart to get your results on-line. We can help you to sign up.

## 2018-08-14 ENCOUNTER — OFFICE VISIT (OUTPATIENT)
Dept: INTERNAL MEDICINE CLINIC | Age: 73
End: 2018-08-14

## 2018-08-14 VITALS
HEIGHT: 66 IN | TEMPERATURE: 97.8 F | BODY MASS INDEX: 36.48 KG/M2 | SYSTOLIC BLOOD PRESSURE: 137 MMHG | DIASTOLIC BLOOD PRESSURE: 60 MMHG | OXYGEN SATURATION: 99 % | HEART RATE: 58 BPM | WEIGHT: 227 LBS | RESPIRATION RATE: 16 BRPM

## 2018-08-14 DIAGNOSIS — S91.331A PUNCTURE WOUND OF RIGHT FOOT, INITIAL ENCOUNTER: ICD-10-CM

## 2018-08-14 DIAGNOSIS — L30.9 ECZEMA, UNSPECIFIED TYPE: ICD-10-CM

## 2018-08-14 DIAGNOSIS — I89.0 LYMPHEDEMA: ICD-10-CM

## 2018-08-14 DIAGNOSIS — E11.65 TYPE 2 DIABETES MELLITUS WITH HYPERGLYCEMIA, WITHOUT LONG-TERM CURRENT USE OF INSULIN (HCC): ICD-10-CM

## 2018-08-14 DIAGNOSIS — L02.415 CELLULITIS AND ABSCESS OF RIGHT LOWER EXTREMITY: Primary | ICD-10-CM

## 2018-08-14 DIAGNOSIS — L03.115 CELLULITIS AND ABSCESS OF RIGHT LOWER EXTREMITY: Primary | ICD-10-CM

## 2018-08-14 RX ORDER — TRIAMCINOLONE ACETONIDE 1 MG/G
CREAM TOPICAL 2 TIMES DAILY
Qty: 30 G | Refills: 1 | Status: SHIPPED | OUTPATIENT
Start: 2018-08-14 | End: 2019-02-19 | Stop reason: ALTCHOICE

## 2018-08-14 RX ORDER — FUROSEMIDE 20 MG/1
TABLET ORAL
Qty: 30 TAB | Refills: 2 | Status: SHIPPED | OUTPATIENT
Start: 2018-08-14 | End: 2019-01-01 | Stop reason: SDUPTHER

## 2018-08-14 RX ORDER — CEPHALEXIN 500 MG/1
500 CAPSULE ORAL 3 TIMES DAILY
Qty: 21 CAP | Refills: 0 | Status: SHIPPED | OUTPATIENT
Start: 2018-08-14 | End: 2018-08-21

## 2018-08-14 NOTE — PROGRESS NOTES
Chief Complaint   Patient presents with    Leg Swelling     R/lower leg red, edema, painful, skin warm to the touch, broken blisters and itching    Puncture Wound     Pt stepped on a wire and punctured the bottom of hisR/foot thru the sole of his tennis shoe     I have reviewed the patient's medical history in detail and updated the computerized patient record. Health Maintenance reviewed. 1. Have you been to the ER, urgent care clinic since your last visit? Hospitalized since your last visit?no    2. Have you seen or consulted any other health care providers outside of the [address] [place] [address] [place] since your last visit? Include any pap smears or colon screening. No    Fall Risk Assessment, last 12 mths 8/14/2018   Able to walk? Yes   Fall in past 12 months? No       PHQ over the last two weeks 8/14/2018   Little interest or pleasure in doing things Several days   Feeling down, depressed, irritable, or hopeless Several days   Total Score PHQ 2 2       Abuse Screening Questionnaire 1/16/2018   Do you ever feel afraid of your partner? N   Are you in a relationship with someone who physically or mentally threatens you? N   Is it safe for you to go home?  Y       ADL Assessment 1/16/2018   Feeding yourself No Help Needed   Getting from bed to chair No Help Needed   Getting dressed No Help Needed   Bathing or showering No Help Needed   Walk across the room (includes cane/walker) No Help Needed   Using the telphone No Help Needed   Taking your medications No Help Needed   Preparing meals No Help Needed   Managing money (expenses/bills) No Help Needed   Moderately strenuous housework (laundry) No Help Needed   Shopping for personal items (toiletries/medicines) No Help Needed   Shopping for groceries No Help Needed   Driving No Help Needed   Climbing a flight of stairs No Help Needed   Getting to places beyond walking distances No Help Needed

## 2018-08-14 NOTE — MR AVS SNAPSHOT
Jesus Matthews 
 
 
 [address] [place] [address] Road. [address] [place] [address] 
[phone] Patient: Triny Romero MRN: [id number] Visit Information Date & Time Provider Department Dept. Phone Encounter #  
 2018  9:30 AM [facility] [phone] [id number] Follow-up Instructions Return in about 3 days (around 2018) for routine follow up. Your Appointments 2018  8:00 AM  
ROUTINE CARE with Abilio Brady MD  
[facility] ([address] [place] [address]) Appt Note: f/u on dm /18s; f/u on dm /18smc  
 [address] [place] [address]. [address] [facility] Catherine Brady [address]  
[phone]  
  
   
 [address] [place] [address] Road [address] Upcoming Health Maintenance Date Due Influenza Age 5 to Adult 2018 HEMOGLOBIN A1C Q6M 2019 FOOT EXAM Q1 2019 MICROALBUMIN Q1 2019 EYE EXAM RETINAL OR DILATED Q1 2019 LIPID PANEL Q1 2019 MEDICARE YEARLY EXAM 2019 GLAUCOMA SCREENING Q2Y 2020 COLONOSCOPY 2025 DTaP/Tdap/Td series (2 - Td) 6/15/2027 Allergies as of 2018  Review Complete On: 2018 By: Abilio Brady MD  
  
 Severity Noted Reaction Type Reactions Lipitor [Atorvastatin]  10/23/2013    Itching Current Immunizations  Reviewed on 2018 Name Date Influenza High Dose Vaccine PF 10/16/2017, 2016 Influenza Vaccine 10/23/2013 Influenza Vaccine Sonja Vijaya) 2014 Influenza Vaccine (Quad) PF 10/28/2015 Pneumococcal Conjugate (PCV-13) 2016 Pneumococcal Polysaccharide (PPSV-23) 2014 Tdap 6/15/2017 Reviewed by Abilio Brady MD on 2018 at  9:38 AM  
You Were Diagnosed With   
  
 Codes Comments Cellulitis and abscess of right lower extremity    -  Primary ICD-10-CM: L03.115, L02.415 ICD-9-CM: 823. 6 Lymphedema     ICD-10-CM: I89.0 ICD-9-CM: 347.2 Puncture wound of right foot, initial encounter     ICD-10-CM: Z14.351V ICD-9-CM: 892.0 Type 2 diabetes mellitus with hyperglycemia, without long-term current use of insulin (HCC)     ICD-10-CM: E11.65 ICD-9-CM: 250.00, 790.29 Eczema, unspecified type     ICD-10-CM: L30.9 ICD-9-CM: 692.9 Vitals BP Pulse Temp Resp Height(growth percentile) Weight(growth percentile)  
 137/60 (BP 1 Location: Left arm, BP Patient Position: Sitting) (!) 58 97.8 °F (36.6 °C) (Oral) 16 5' 6\" (1.676 m) 227 lb (103 kg) SpO2 BMI Smoking Status 99% 36.64 kg/m2 Never Smoker BMI and BSA Data Body Mass Index Body Surface Area  
 36.64 kg/m 2 2.19 m 2 Preferred Pharmacy Pharmacy Name Phone [facility] [phone] Your Updated Medication List  
  
   
This list is accurate as of 8/14/18  9:47 AM.  Always use your most recent med list.  
  
  
  
  
 ACCU-CHEK PHYLLIS PLUS TEST STRP strip Generic drug:  glucose blood VI test strips  
  
 captopril 25 mg tablet Commonly known as:  CAPOTEN  
TAKE 1 TABLET BY MOUTH TWO (2) TIMES A DAY FOR BLOOD PRESSURE  
  
 cephALEXin 500 mg capsule Commonly known as:  Merdis Perone Take 1 Cap by mouth three (3) times daily for 7 days. dexamethasone 0.1 % ophthalmic solution Commonly known as:  DECADRON  
  
 furosemide 20 mg tablet Commonly known as:  LASIX As needed for swelling  
  
 hydroCHLOROthiazide 25 mg tablet Commonly known as:  HYDRODIURIL Take 1 Tab by mouth daily. metFORMIN 500 mg tablet Commonly known as:  GLUCOPHAGE Take 1 Tab by mouth three (3) times daily (with meals). metoprolol succinate 100 mg tablet Commonly known as:  TOPROL-XL  
TAKE 1 TABLET BY MOUTH DAILY FOR HEART & BLOOD PRESSURE  
  
 naproxen 500 mg tablet Commonly known as:  NAPROSYN  
 TAKE 1 TABLET BY MOUTH TWO (2) TIMES DAILY (WITH MEALS). AS NEEDED FOR GOUT  
  
 triamcinolone acetonide 0.1 % topical cream  
Commonly known as:  KENALOG Apply  to affected area two (2) times a day. use thin layer to bumps Prescriptions Sent to Pharmacy Refills  
 cephALEXin (KEFLEX) 500 mg capsule 0 Sig: Take 1 Cap by mouth three (3) times daily for 7 days. Class: Normal  
 Pharmacy: [address] Ph #: [phone] Route: Oral  
 furosemide (LASIX) 20 mg tablet 2 Sig: As needed for swelling Class: Normal  
 Pharmacy: [address] [place] [address] [facility] Ph #: [phone]  
 triamcinolone acetonide (KENALOG) 0.1 % topical cream 1 Sig: Apply  to affected area two (2) times a day. use thin layer to bumps Class: Normal  
 Pharmacy: [address] Ph #: [phone] Route: Topical  
  
Follow-up Instructions Return in about 3 days (around 8/17/2018) for routine follow up. To-Do List   
 08/14/2018 Imaging:  XR FOOT RT MIN 3 V Patient Instructions Lymphedema: Care Instructions Your Care Instructions Lymphedema is fluid that builds up in the arms or legs. It is often caused by surgery to remove lymph nodes during cancer treatment, especially breast cancer surgery, which can cause fluid to build up in the arm. It can happen after radiation treatment to an area that involves lymph nodes. It also can be caused by a fractured bone or surgery to fix a fracture. And some medicines also can cause lymphedema. Some people get it for unknown reasons. Normally, lymph nodes trap bacteria and other substances as fluid flows through them. Then, the white cells in the body's defense, or immune, system can destroy the substances.  But if there are few or no lymph nodes-or if the lymph system in an arm or leg has been damaged-fluid can build up in the affected arm or leg. You can take simple steps at home to help treat or prevent fluid buildup. Treatment may include raising the arm or leg to let gravity drain the fluid. You also can wear compression stockings or sleeves. Follow-up care is a key part of your treatment and safety. Be sure to make and go to all appointments, and call your doctor if you are having problems. It's also a good idea to know your test results and keep a list of the medicines you take. How can you care for yourself at home? · Wear a compression stocking or sleeve as your doctor suggests. It can help keep fluid from pooling in an arm or leg. Wear it during air travel. · Prop up the swollen arm or leg on a pillow anytime you sit or lie down. Try to keep it above the level of your heart. This will help reduce swelling. · Avoid crossing your legs if your legs are swollen. · Get some exercise on most days of the week. Increase the intensity of exercise slowly. Water aerobics can help reduce swelling by helping fluid move around. Wear your compression stocking or sleeve during exercise, but not during water exercise. · See a physical therapist. He or she can teach you how to do self-massage to help fluid move around. You also can learn what activities would be best for you. · Keep your feet clean and wear clean socks or stockings every day. Check your feet often for signs of infection, such as redness or heat. Do not walk barefoot. · If you have had lymph nodes removed from under your arm: ¨ Do not have blood drawn from the arm on the side of the lymph node surgery. ¨ Do not allow a blood pressure cuff to be placed on that arm. If you are in the hospital, make sure your nurse and other hospital staff know of your condition. ¨ Wear gloves when gardening or doing other activities that may lead to cuts on your fingers or hands. · If you have had lymph nodes removed from your groin: ¨ Bathe your feet daily in lukewarm, not hot, water. Use a mild soap that has a moisturizer, or use a moisturizer separately. ¨ Check your feet for blisters or cuts. ¨ Wear comfortable and supportive shoes that fit properly. ¨ Wear the correct size of panty hose and stockings. Avoid garters or knee-high or thigh-high stockings. · Ask your doctor how to treat any cuts, scratches, insect bites, or other injuries that may occur. · Use sunscreen and insect repellent when outdoors to protect your skin from sunburn and insect bites. · Wear medical alert jewelry that says you have lymphedema. You can buy these at most [employer] and on the Internet. When should you call for help? Call your doctor now or seek immediate medical care if: 
  · You have signs of infection, such as: 
¨ Increased pain, swelling, warmth, or redness. ¨ Red streaks leading from the area. ¨ Pus draining from the area. ¨ A fever.  
 Watch closely for changes in your health, and be sure to contact your doctor if: 
  · You have new or worse symptoms from lymphedema.  
  · You do not get better as expected. Where can you learn more? Go to http://parth-sean.info/. Enter V398 in the search box to learn more about \"Lymphedema: Care Instructions. \" Current as of: May 12, 2017 Content Version: 11.7 © 0128-2697 [employer]. Care instructions adapted under license by [employer] (which disclaims liability or warranty for this information). If you have questions about a medical condition or this instruction, always ask your healthcare professional. [place] [address] any warranty or liability for your use of this information. Introducing [facility]! [facility] introduces RockThePost patient portal. Now you can access parts of your medical record, email your doctor's office, and request medication refills online. 1. In your internet browser, go to [email or web address] 2. Click on the First Time User? Click Here link in the Sign In box. You will see the New Member Sign Up page. 3. Enter your Proximetry Access Code exactly as it appears below. You will not need to use this code after youve completed the sign-up process. If you do not sign up before the expiration date, you must request a new code. · Proximetry Access Code: [email or web address] Expires: 11/12/2018  8:51 AM 
 
4. Enter the last four digits of your Social Security Number (xxxx) and Date of Birth (mm/dd/yyyy) as indicated and click Submit. You will be taken to the next sign-up page. 5. Create a Proximetry ID. This will be your Proximetry login ID and cannot be changed, so think of one that is secure and easy to remember. 6. Create a Proximetry password. You can change your password at any time. 7. Enter your Password Reset Question and Answer. This can be used at a later time if you forget your password. 8. Enter your e-mail address. You will receive e-mail notification when new information is available in [address] 9. Click Sign Up. You can now view and download portions of your medical record. 10. Click the Download Summary menu link to download a portable copy of your medical information. If you have questions, please visit the Frequently Asked Questions section of the Proximetry website. Remember, Proximetry is NOT to be used for urgent needs. For medical emergencies, dial 911. Now available from your iPhone and Android! Please provide this summary of care documentation to your next provider. Your primary care clinician is listed as Hardy Anne. If you have any questions after today's visit, please call [phone].

## 2018-08-14 NOTE — PROGRESS NOTES
HISTORY OF [address] is a 74 y.o. male. Leg Swelling   The history is provided by the patient. This is a recurrent problem. The current episode started more than 1 week ago. The problem occurs daily. The problem has been gradually worsening. Pertinent negatives include no chest pain and no shortness of breath. Associated symptoms comments: R> left but Bi swelling. Exacerbated by: itching and scratch. Puncture Wound   This is a new (Sat) problem. 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 Pertinent negatives include no chest pain and no shortness of breath. Associated symptoms comments: Wound min c/o pain. Relieved by: removed shiny wire. Patient Active Problem List   Diagnosis Code    Gout M10.9    Essential hypertension I10    Prostate cancer ([facility] C61    Type 2 diabetes mellitus with hyperglycemia, without long-term current use of insulin (HCC) E11.65    Type 2 diabetes mellitus with nephropathy ([facility] E11.21    Severe obesity (BMI 35.0-39. 9) with comorbidity ([facility] [address] [facility] E66.01       Review of Systems   Constitutional: Negative for fever. Respiratory: Negative for shortness of breath. Cardiovascular: Negative for chest pain. Skin: Positive for itching and rash. Social History     Social History    Marital status:      Spouse name: N/A    Number of children: 2    Years of education: N/A     Occupational History    retired cuts grass      Social History Main Topics    Smoking status: Never Smoker    Smokeless tobacco: Former User      Comment: snuff    Alcohol use No      Comment: seldom    Drug use: No    Sexual activity: Not Currently     Other Topics Concern    Not on file     Social History Narrative    Wife with dementia, lots of issues with money and his and her finances.     Wife has hip Fx, passed away,        Physical Exam  Visit Vitals    /60 (BP 1 Location: Left arm, BP Patient Position: Sitting)    Pulse (!) 58    Temp 97.8 °F (36.6 °C) (Oral)    Resp 16    Ht 5' 6\" (1.676 m)    Wt 227 lb (103 kg)    SpO2 99%    BMI 36.64 kg/m2     WD WN male NAD  Heart RRR without murmers clicks or rubs  Lungs CTA  Abdo soft nontender  Ext no edema    ASSESSMENT and PLAN  Encounter Diagnoses   Name Primary?  Cellulitis and abscess of right lower extremity Yes    Lymphedema     Puncture wound of right foot, initial encounter     Type 2 diabetes mellitus with hyperglycemia, without long-term current use of insulin (HCC)     Eczema, unspecified type      Orders Placed This Encounter    XR FOOT RT MIN 3 V    cephALEXin (KEFLEX) 500 mg capsule    furosemide (LASIX) 20 mg tablet    triamcinolone acetonide (KENALOG) 0.1 % topical cream     Follow-up Disposition:  Return in about 3 days (around 8/17/2018) for routine follow up.

## 2018-08-14 NOTE — PATIENT INSTRUCTIONS
Lymphedema: Care Instructions  Your Care Instructions    Lymphedema is fluid that builds up in the arms or legs. It is often caused by surgery to remove lymph nodes during cancer treatment, especially breast cancer surgery, which can cause fluid to build up in the arm. It can happen after radiation treatment to an area that involves lymph nodes. It also can be caused by a fractured bone or surgery to fix a fracture. And some medicines also can cause lymphedema. Some people get it for unknown reasons. Normally, lymph nodes trap bacteria and other substances as fluid flows through them. Then, the white cells in the body's defense, or immune, system can destroy the substances. But if there are few or no lymph nodes-or if the lymph system in an arm or leg has been damaged-fluid can build up in the affected arm or leg. You can take simple steps at home to help treat or prevent fluid buildup. Treatment may include raising the arm or leg to let gravity drain the fluid. You also can wear compression stockings or sleeves. Follow-up care is a key part of your treatment and safety. Be sure to make and go to all appointments, and call your doctor if you are having problems. It's also a good idea to know your test results and keep a list of the medicines you take. How can you care for yourself at home? · Wear a compression stocking or sleeve as your doctor suggests. It can help keep fluid from pooling in an arm or leg. Wear it during air travel. · Prop up the swollen arm or leg on a pillow anytime you sit or lie down. Try to keep it above the level of your heart. This will help reduce swelling. · Avoid crossing your legs if your legs are swollen. · Get some exercise on most days of the week. Increase the intensity of exercise slowly. Water aerobics can help reduce swelling by helping fluid move around. Wear your compression stocking or sleeve during exercise, but not during water exercise.   · See a physical therapist. He or she can teach you how to do self-massage to help fluid move around. You also can learn what activities would be best for you. · Keep your feet clean and wear clean socks or stockings every day. Check your feet often for signs of infection, such as redness or heat. Do not walk barefoot. · If you have had lymph nodes removed from under your arm:  ¨ Do not have blood drawn from the arm on the side of the lymph node surgery. ¨ Do not allow a blood pressure cuff to be placed on that arm. If you are in the hospital, make sure your nurse and other hospital staff know of your condition. ¨ Wear gloves when gardening or doing other activities that may lead to cuts on your fingers or hands. · If you have had lymph nodes removed from your groin:  ¨ Bathe your feet daily in lukewarm, not hot, water. Use a mild soap that has a moisturizer, or use a moisturizer separately. ¨ Check your feet for blisters or cuts. ¨ Wear comfortable and supportive shoes that fit properly. ¨ Wear the correct size of panty hose and stockings. Avoid garters or knee-high or thigh-high stockings. · Ask your doctor how to treat any cuts, scratches, insect bites, or other injuries that may occur. · Use sunscreen and insect repellent when outdoors to protect your skin from sunburn and insect bites. · Wear medical alert jewelry that says you have lymphedema. You can buy these at most drugstores and on the Internet. When should you call for help? Call your doctor now or seek immediate medical care if:    · You have signs of infection, such as:  ¨ Increased pain, swelling, warmth, or redness. ¨ Red streaks leading from the area. ¨ Pus draining from the area. ¨ A fever.    Watch closely for changes in your health, and be sure to contact your doctor if:    · You have new or worse symptoms from lymphedema.     · You do not get better as expected. Where can you learn more?   Go to http://parth-sean.info/. Enter V398 in the search box to learn more about \"Lymphedema: Care Instructions. \"  Current as of: May 12, 2017  Content Version: 11.7  © 0130-2594 [employer], [employer]. Care instructions adapted under license by [employer] (which disclaims liability or warranty for this information). If you have questions about a medical condition or this instruction, always ask your healthcare professional. [place] [address] any warranty or liability for your use of this information.

## 2018-08-15 ENCOUNTER — HOSPITAL ENCOUNTER (OUTPATIENT)
Dept: GENERAL RADIOLOGY | Age: 73
Discharge: HOME | End: 2018-08-15
Payer: MEDICARE

## 2018-08-15 DIAGNOSIS — M79.673 FOOT PAIN: ICD-10-CM

## 2018-08-15 PROCEDURE — 73630 X-RAY EXAM OF FOOT: CPT

## 2018-08-15 NOTE — PROGRESS NOTES
No bony involvement from the wire injury. Send normal/stable results letter. Your results are normal/stable. If not signed up, consider getting my chart to get your results on-line. We can help you to sign up.

## 2018-08-17 ENCOUNTER — OFFICE VISIT (OUTPATIENT)
Dept: INTERNAL MEDICINE CLINIC | Age: 73
End: 2018-08-17

## 2018-08-17 VITALS
WEIGHT: 225 LBS | BODY MASS INDEX: 36.16 KG/M2 | HEIGHT: 66 IN | DIASTOLIC BLOOD PRESSURE: 67 MMHG | RESPIRATION RATE: 18 BRPM | SYSTOLIC BLOOD PRESSURE: 148 MMHG | HEART RATE: 74 BPM | TEMPERATURE: 96.9 F | OXYGEN SATURATION: 99 %

## 2018-08-17 DIAGNOSIS — I89.0 LYMPHEDEMA OF BOTH LOWER EXTREMITIES: Primary | ICD-10-CM

## 2018-08-17 DIAGNOSIS — E11.65 TYPE 2 DIABETES MELLITUS WITH HYPERGLYCEMIA, WITHOUT LONG-TERM CURRENT USE OF INSULIN (HCC): ICD-10-CM

## 2018-08-17 DIAGNOSIS — L03.115 CELLULITIS OF RIGHT LOWER EXTREMITY: ICD-10-CM

## 2018-08-17 DIAGNOSIS — C61 PROSTATE CANCER (HCC): ICD-10-CM

## 2018-08-17 NOTE — MR AVS SNAPSHOT
[address] 7563 [facility] 
[phone] Patient: Humphrey Velez MRN: [id number] :1945 Visit Information Date & Time Provider Department Dept. Phone Encounter #  
 2018  9:00 AM Merly North MD [place] [address] [phone] [id number] Follow-up Instructions Return in about 6 weeks (around 2018) for routine follow up. Your Appointments 2018  8:00 AM  
ROUTINE CARE with Merly North MD  
[facility] ([facility]) Appt Note: f/u on dm /18smc; f/u on dm 18smc  
 [address] [place] [address]. [address] [place] [id number]  
[phone]  
  
   
 [address] [place] [address] [address] Upcoming Health Maintenance Date Due Influenza Age 5 to Adult 2018 HEMOGLOBIN A1C Q6M 2019 FOOT EXAM Q1 2019 MICROALBUMIN Q1 2019 EYE EXAM RETINAL OR DILATED Q1 2019 LIPID PANEL Q1 2019 MEDICARE YEARLY EXAM 2019 GLAUCOMA SCREENING Q2Y 2020 COLONOSCOPY 2025 DTaP/Tdap/Td series (2 - Td) 6/15/2027 Allergies as of 2018  Review Complete On: 2018 By: Melinda Pierson LPN Severity Noted Reaction Type Reactions Lipitor [Atorvastatin]  10/23/2013    Itching Current Immunizations  Reviewed on 2018 Name Date Influenza High Dose Vaccine PF 10/16/2017, 2016 Influenza Vaccine 10/23/2013 Influenza Vaccine Jennifer Nile) 2014 Influenza Vaccine (Quad) PF 10/28/2015 Pneumococcal Conjugate (PCV-13) 2016 Pneumococcal Polysaccharide (PPSV-23) 2014 Tdap 6/15/2017 Not reviewed this visit You Were Diagnosed With   
  
 Codes Comments Lymphedema of both lower extremities    -  Primary ICD-10-CM: I89.0 ICD-9-CM: 048.7 Cellulitis of right lower extremity     ICD-10-CM: L03.115 ICD-9-CM: 682.6 Vitals BP Pulse Temp Resp Height(growth percentile) Weight(growth percentile) 148/67 (BP 1 Location: Left arm, BP Patient Position: Sitting) 74 96.9 °F (36.1 °C) (Oral) 18 5' 6\" (1.676 m) 225 lb (102.1 kg) SpO2 BMI Smoking Status 99% 36.32 kg/m2 Never Smoker BMI and BSA Data Body Mass Index Body Surface Area  
 36.32 kg/m 2 2.18 m 2 Preferred Pharmacy Pharmacy Name Phone [facility], [address] [phone] Your Updated Medication List  
  
   
This list is accurate as of 8/17/18  9:18 AM.  Always use your most recent med list.  
  
  
  
  
 ACCU-CHEK PHYLLIS PLUS TEST STRP strip Generic drug:  glucose blood VI test strips  
  
 captopril 25 mg tablet Commonly known as:  CAPOTEN  
TAKE 1 TABLET BY MOUTH TWO (2) TIMES A DAY FOR BLOOD PRESSURE  
  
 cephALEXin 500 mg capsule Commonly known as:  Suzen [place] Take 1 Cap by mouth three (3) times daily for 7 days. dexamethasone 0.1 % ophthalmic solution Commonly known as:  DECADRON  
  
 furosemide 20 mg tablet Commonly known as:  LASIX As needed for swelling  
  
 hydroCHLOROthiazide 25 mg tablet Commonly known as:  HYDRODIURIL Take 1 Tab by mouth daily. metFORMIN 500 mg tablet Commonly known as:  GLUCOPHAGE Take 1 Tab by mouth three (3) times daily (with meals). metoprolol succinate 100 mg tablet Commonly known as:  TOPROL-XL  
TAKE 1 TABLET BY MOUTH DAILY FOR HEART & BLOOD PRESSURE  
  
 naproxen 500 mg tablet Commonly known as:  NAPROSYN  
TAKE 1 TABLET BY MOUTH TWO (2) TIMES DAILY (WITH MEALS). AS NEEDED FOR GOUT  
  
 triamcinolone acetonide 0.1 % topical cream  
Commonly known as:  KENALOG Apply  to affected area two (2) times a day. use thin layer to bumps Follow-up Instructions Return in about 6 weeks (around 9/28/2018) for routine follow up. Introducing [facility]! Cesar Edgard introduces [employer] patient portal. Now you can access parts of your medical record, email your doctor's office, and request medication refills online. 1. In your internet browser, go to [email or web address] 2. Click on the First Time User? Click Here link in the Sign In box. You will see the New Member Sign Up page. 3. Enter your [employer] Access Code exactly as it appears below. You will not need to use this code after youve completed the sign-up process. If you do not sign up before the expiration date, you must request a new code. · [employer] Access Code: [email or web address] Expires: 11/12/2018  8:51 AM 
 
4. Enter the last four digits of your Social Security Number (xxxx) and Date of Birth (mm/dd/yyyy) as indicated and click Submit. You will be taken to the next sign-up page. 5. Create a [employer] ID. This will be your [employer] login ID and cannot be changed, so think of one that is secure and easy to remember. 6. Create a [employer] password. You can change your password at any time. 7. Enter your Password Reset Question and Answer. This can be used at a later time if you forget your password. 8. Enter your e-mail address. You will receive e-mail notification when new information is available in [address] 9. Click Sign Up. You can now view and download portions of your medical record. 10. Click the Download Summary menu link to download a portable copy of your medical information. If you have questions, please visit the Frequently Asked Questions section of the [employer] website. Remember, [employer] is NOT to be used for urgent needs. For medical emergencies, dial 911. Now available from your iPhone and Android! Please provide this summary of care documentation to your next provider. Your primary care clinician is listed as Abilio Shields. If you have any questions after today's visit, please call [phone].

## 2018-08-17 NOTE — PROGRESS NOTES
HISTORY OF [address] is a 74 y.o. male. Leg Swelling   The history is provided by the patient. This is a recurrent problem. The problem occurs constantly. The problem has been gradually improving. Pertinent negatives include no chest pain and no shortness of breath. Treatments tried: Ab lasix. The treatment provided moderate relief. X ray of foot neg   Prostate cancer currently in remission, sees urology on a regular basis. He has had swelling his right leg previously, has had old injuries to that leg. Previous ultrasounds have been negative for DVT. Current Outpatient Prescriptions   Medication Sig Dispense Refill    cephALEXin (KEFLEX) 500 mg capsule Take 1 Cap by mouth three (3) times daily for 7 days. 21 Cap 0    furosemide (LASIX) 20 mg tablet As needed for swelling 30 Tab 2    triamcinolone acetonide (KENALOG) 0.1 % topical cream Apply  to affected area two (2) times a day. use thin layer to bumps 30 g 1    ACCU-CHEK PHYLLIS PLUS TEST STRP strip       dexamethasone (DECADRON) 0.1 % ophthalmic solution       metFORMIN (GLUCOPHAGE) 500 mg tablet Take 1 Tab by mouth three (3) times daily (with meals). (Patient taking differently: Take 500 mg by mouth two (2) times daily (with meals). ) 270 Tab 3    hydroCHLOROthiazide (HYDRODIURIL) 25 mg tablet Take 1 Tab by mouth daily. 90 Tab 3    captopril (CAPOTEN) 25 mg tablet TAKE 1 TABLET BY MOUTH TWO (2) TIMES A DAY FOR BLOOD PRESSURE 180 Tab 3    metoprolol succinate (TOPROL-XL) 100 mg tablet TAKE 1 TABLET BY MOUTH DAILY FOR HEART & BLOOD PRESSURE 90 Tab 3    naproxen (NAPROSYN) 500 mg tablet TAKE 1 TABLET BY MOUTH TWO (2) TIMES DAILY (WITH MEALS). AS NEEDED FOR GOUT 60 Tab 5     Allergies   Allergen Reactions    Lipitor [Atorvastatin] Itching         Review of Systems   Constitutional: Negative for fever. Respiratory: Negative for shortness of breath. Cardiovascular: Positive for leg swelling. Negative for chest pain. In general improved   Skin: Positive for rash. Improving       Physical Exam  Visit Vitals    /67 (BP 1 Location: Left arm, BP Patient Position: Sitting)    Pulse 74    Temp 96.9 °F (36.1 °C) (Oral)    Resp 18    Ht 5' 6\" (1.676 m)    Wt 225 lb (102.1 kg)    SpO2 99%    BMI 36.32 kg/m2     WD WN male NAD  Heart RRR without murmers clicks or rubs  Lungs CTA  Abdo soft nontender  Ext R > L 48.5 vs 43.5edema  X-ray negative for bony involvement. ASSESSMENT and PLAN  Encounter Diagnoses   Name Primary?  Lymphedema of both lower extremities Yes    Cellulitis of right lower extremity     Type 2 diabetes mellitus with hyperglycemia, without long-term current use of insulin (HCC)     Prostate cancer ([facility] [address]      Finish antibiotic, no ultrasound doubt DVT. Diabetes and prostate cancer are stable. Diabetes reasonably controlled  Follow-up Disposition:  Return in about 6 weeks (around 9/28/2018) for routine follow up.

## 2018-08-17 NOTE — PROGRESS NOTES
Chief Complaint   Patient presents with    Leg Swelling     L/R leg red, swollen and tender      I have reviewed the patient's medical history in detail and updated the computerized patient record. Health Maintenance reviewed. 1. Have you been to the ER, urgent care clinic since your last visit? Hospitalized since your last visit?no    2. Have you seen or consulted any other health care providers outside of the [address] [place] [address] [place] since your last visit? Include any pap smears or colon screening. No    Encouraged pt to discuss pt's wishes with spouse/partner/family and bring them in the next appt to follow thru with the Advanced Directive    Fall Risk Assessment, last 12 mths 8/17/2018   Able to walk? Yes   Fall in past 12 months? No       PHQ over the last two weeks 8/17/2018   Little interest or pleasure in doing things Several days   Feeling down, depressed, irritable, or hopeless Several days   Total Score PHQ 2 2       Abuse Screening Questionnaire 1/16/2018   Do you ever feel afraid of your partner? N   Are you in a relationship with someone who physically or mentally threatens you? N   Is it safe for you to go home?  Y       ADL Assessment 1/16/2018   Feeding yourself No Help Needed   Getting from bed to chair No Help Needed   Getting dressed No Help Needed   Bathing or showering No Help Needed   Walk across the room (includes cane/walker) No Help Needed   Using the telphone No Help Needed   Taking your medications No Help Needed   Preparing meals No Help Needed   Managing money (expenses/bills) No Help Needed   Moderately strenuous housework (laundry) No Help Needed   Shopping for personal items (toiletries/medicines) No Help Needed   Shopping for groceries No Help Needed   Driving No Help Needed   Climbing a flight of stairs No Help Needed   Getting to places beyond walking distances No Help Needed

## 2018-09-28 ENCOUNTER — OFFICE VISIT (OUTPATIENT)
Dept: INTERNAL MEDICINE CLINIC | Age: 73
End: 2018-09-28

## 2018-09-28 VITALS
HEIGHT: 66 IN | OXYGEN SATURATION: 99 % | DIASTOLIC BLOOD PRESSURE: 70 MMHG | HEART RATE: 54 BPM | SYSTOLIC BLOOD PRESSURE: 136 MMHG | RESPIRATION RATE: 18 BRPM | BODY MASS INDEX: 35.03 KG/M2 | TEMPERATURE: 96.1 F | WEIGHT: 218 LBS

## 2018-09-28 DIAGNOSIS — M25.512 LEFT SHOULDER PAIN, UNSPECIFIED CHRONICITY: ICD-10-CM

## 2018-09-28 DIAGNOSIS — N52.01 ERECTILE DYSFUNCTION DUE TO ARTERIAL INSUFFICIENCY: ICD-10-CM

## 2018-09-28 DIAGNOSIS — I89.0 LYMPHEDEMA: Primary | ICD-10-CM

## 2018-09-28 DIAGNOSIS — Z23 ENCOUNTER FOR IMMUNIZATION: ICD-10-CM

## 2018-09-28 DIAGNOSIS — E11.65 TYPE 2 DIABETES MELLITUS WITH HYPERGLYCEMIA, WITHOUT LONG-TERM CURRENT USE OF INSULIN (HCC): ICD-10-CM

## 2018-09-28 RX ORDER — METOPROLOL SUCCINATE 100 MG/1
TABLET, EXTENDED RELEASE ORAL
Qty: 90 TAB | Refills: 3 | Status: SHIPPED | OUTPATIENT
Start: 2018-09-28 | End: 2019-01-01 | Stop reason: SDUPTHER

## 2018-09-28 RX ORDER — VARDENAFIL HYDROCHLORIDE 20 MG/1
20 TABLET ORAL AS NEEDED
Qty: 2 TAB | Refills: 5 | Status: SHIPPED | OUTPATIENT
Start: 2018-09-28

## 2018-09-28 NOTE — PATIENT INSTRUCTIONS
Vaccine Information Statement Influenza (Flu) Vaccine (Inactivated or Recombinant): What you need to know Many Vaccine Information Statements are available in Maltese and other languages. See www.immunize.org/vis Hojas de Información Sobre Vacunas están disponibles en Español y en muchos otros idiomas. Visite www.immunize.org/vis 1. Why get vaccinated? Influenza (flu) is a contagious disease that spreads around the [place] every year, usually between October and May. Flu is caused by influenza viruses, and is spread mainly by coughing, sneezing, and close contact. Anyone can get flu. Flu strikes suddenly and can last several days. Symptoms vary by age, but can include: 
 fever/chills  sore throat  muscle aches  fatigue  cough  headache  runny or stuffy nose Flu can also lead to pneumonia and blood infections, and cause diarrhea and seizures in children. If you have a medical condition, such as heart or lung disease, flu can make it worse. Flu is more dangerous for some people. Infants and young children, people 72years of age and older, pregnant women, and people with certain health conditions or a weakened immune system are at greatest risk. Each year thousands of people in the [facility] die from flu, and many more are hospitalized. Flu vaccine can: 
 keep you from getting flu, 
 make flu less severe if you do get it, and 
 keep you from spreading flu to your family and other people. 2. Inactivated and recombinant flu vaccines A dose of flu vaccine is recommended every flu season. Children 6 months through 6years of age may need two doses during the same flu season. Everyone else needs only one dose each flu season.   
 
 
Some inactivated flu vaccines contain a very small amount of a mercury-based preservative called thimerosal. Studies have not shown thimerosal in vaccines to be harmful, but flu vaccines that do not contain thimerosal are available. There is no live flu virus in flu shots. They cannot cause the flu. There are many flu viruses, and they are always changing. Each year a new flu vaccine is made to protect against three or four viruses that are likely to cause disease in the upcoming flu season. But even when the vaccine doesnt exactly match these viruses, it may still provide some protection Flu vaccine cannot prevent: 
 flu that is caused by a virus not covered by the vaccine, or 
 illnesses that look like flu but are not. It takes about 2 weeks for protection to develop after vaccination, and protection lasts through the flu season. 3. Some people should not get this vaccine Tell the person who is giving you the vaccine:  If you have any severe, life-threatening allergies. If you ever had a life-threatening allergic reaction after a dose of flu vaccine, or have a severe allergy to any part of this vaccine, you may be advised not to get vaccinated. Most, but not all, types of flu vaccine contain a small amount of egg protein.  If you ever had Guillain-Barré Syndrome (also called GBS). Some people with a history of GBS should not get this vaccine. This should be discussed with your doctor.  If you are not feeling well. It is usually okay to get flu vaccine when you have a mild illness, but you might be asked to come back when you feel better. 4. Risks of a vaccine reaction With any medicine, including vaccines, there is a chance of reactions. These are usually mild and go away on their own, but serious reactions are also possible. Most people who get a flu shot do not have any problems with it. Minor problems following a flu shot include:  
 soreness, redness, or swelling where the shot was given  hoarseness  sore, red or itchy eyes  cough  fever  aches  headache  itching  fatigue If these problems occur, they usually begin soon after the shot and last 1 or 2 days. More serious problems following a flu shot can include the following:  There may be a small increased risk of Guillain-Barré Syndrome (GBS) after inactivated flu vaccine. This risk has been estimated at 1 or 2 additional cases per million people vaccinated. This is much lower than the risk of severe complications from flu, which can be prevented by flu vaccine.  Young children who get the flu shot along with pneumococcal vaccine (PCV13) and/or DTaP vaccine at the same time might be slightly more likely to have a seizure caused by fever. Ask your doctor for more information. Tell your doctor if a child who is getting flu vaccine has ever had a seizure. Problems that could happen after any injected vaccine:  People sometimes faint after a medical procedure, including vaccination. Sitting or lying down for about 15 minutes can help prevent fainting, and injuries caused by a fall. Tell your doctor if you feel dizzy, or have vision changes or ringing in the ears.  Some people get severe pain in the shoulder and have difficulty moving the arm where a shot was given. This happens very rarely.  Any medication can cause a severe allergic reaction. Such reactions from a vaccine are very rare, estimated at about 1 in a million doses, and would happen within a few minutes to a few hours after the vaccination. As with any medicine, there is a very remote chance of a vaccine causing a serious injury or death. The safety of vaccines is always being monitored. For more information, visit: www.cdc.gov/vaccinesafety/ 
 
5. What if there is a serious reaction? What should I look for?  Look for anything that concerns you, such as signs of a severe allergic reaction, very high fever, or unusual behavior.  
 
Signs of a severe allergic reaction can include hives, swelling of the face and throat, difficulty breathing, a fast heartbeat, dizziness, and weakness  usually within a few minutes to a few hours after the vaccination. What should I do?  If you think it is a severe allergic reaction or other emergency that cant wait, call 9-1-1 and get the person to the nearest hospital. Otherwise, call your doctor.  Reactions should be reported to the Vaccine Adverse Event Reporting System (VAERS). Your doctor should file this report, or you can do it yourself through  the VAERS web site at www.vaers. [facility].gov, or by calling 1-379.478.3810. VAERS does not give medical advice. 6. The National Vaccine Injury Compensation Program 
 
The [facility] Vaccine Injury Compensation Program (VICP) is a federal program that was created to compensate people who may have been injured by certain vaccines. Persons who believe they may have been injured by a vaccine can learn about the program and about filing a claim by calling 4-125.953.1278 or visiting the [facility] [employer] website at www.[facility].gov/vaccinecompensation. There is a time limit to file a claim for compensation. 7. How can I learn more?  Ask your healthcare provider. He or she can give you the vaccine package insert or suggest other sources of information.  Call your local or state health department.  Contact the Centers for Disease Control and Prevention (CDC): 
- Call 7-962.827.8315 (1-800-CDC-INFO) or 
- Visit CDCs website at www.cdc.gov/flu Vaccine Information Statement Inactivated Influenza Vaccine 8/7/2015 
[address] [place] [address] 124[address]-70 Department of Health and [employer] Centers for Disease Control and Prevention Office Use Only

## 2018-09-28 NOTE — PROGRESS NOTES
HISTORY OF PRESENT ILLNESS Nidia Jones is a 74 y.o. male. Leg Swelling The history is provided by the patient. This is a recurrent problem. The current episode started more than 1 week ago. The problem occurs daily. The problem has been gradually improving (although Sx come back). Pertinent negatives include no chest pain and no shortness of breath. Exacerbated by: ?hose. The symptoms are relieved by position (elevation). Treatments tried: not been taking lasix. Previous scans neg for DVT. Hit right shoulder on pickup truck tail gait 3 mo ago hurting ever since. Wants steroid injection A1C=7.4 Has some ED, complains Viagra not working that well. Past Surgical History:  
Procedure Laterality Date  COLONOSCOPY,OMAR MENDEZ,SNARE  1/7/2015  HX APPENDECTOMY    
 burst  
 HX CATARACT REMOVAL Left 07/01/2018  HX ORTHOPAEDIC    
 toe Patient Active Problem List  
Diagnosis Code  Gout M10.9  Essential hypertension I10  
 Prostate cancer ([facility] [address] C61  Type 2 diabetes mellitus with hyperglycemia, without long-term current use of insulin (HCC) E11.65  Type 2 diabetes mellitus with nephropathy (HCC) E11.21  
 Severe obesity (BMI 35.0-39. 9) with comorbidity ([facility] [address] E66.01 Review of Systems Respiratory: Negative for shortness of breath. Cardiovascular: Negative for chest pain. Musculoskeletal: Positive for joint pain. Negative for falls. Hit agaist pickup truck Physical Exam 
Visit Vitals  /70 (BP 1 Location: Left arm, BP Patient Position: Sitting)  Pulse (!) 54  Temp 96.1 °F (35.6 °C) (Oral)  Resp 18  Ht 5' 6\" (1.676 m)  Wt 218 lb (98.9 kg)  SpO2 99%  BMI 35.19 kg/m2 WD WN male NAD Heart RRR without murmers clicks or rubs Lungs CTA Abdo soft nontender Ext no edema Right shoulder no assymetery, positive impingement signs. ASSESSMENT and PLAN Encounter Diagnoses Name Primary?  Lymphedema Yes  Left shoulder pain, unspecified chronicity  Type 2 diabetes mellitus with hyperglycemia, without long-term current use of insulin ([facility] [address]  Encounter for immunization  Erectile dysfunction due to arterial insufficiency Orders Placed This Encounter  DRAIN/INJECT LARGE JOINT/BURSA  INFLUENZA VACCINE INACTIVATED (IIV), SUBUNIT, ADJUVANTED, IM  
 metoprolol succinate (TOPROL-XL) 100 mg tablet  vardenafil (LEVITRA) 20 mg tablet Discussed increased blood sugar. Recommend avoidance of sweets and reduce starchy foods. Especially avoid sweet drinks like sodas and juices. Lose weight to reduce the chance of getting diabetes. Discussed increased blood sugar. Recommend avoidance of sweets and reduce starchy foods. Especially avoid sweet drinks like sodas and juices. Lose weight to reduce the chance of getting diabetes. The left shoulder was assessed and landmarks palpated and marked. The shoulder was prepped with alcohol. Syringe was prepared with 2 cc of kenalog and 5cc of lidocaine. The shoulder was injected using a posterior approach. Patient was notified of signs to look for in terms of post injection complications, like increasing redness or swelling or pain. Follow-up Disposition: Not on File

## 2018-09-28 NOTE — MR AVS SNAPSHOT
[address] 7937 [facility] 
[phone] Patient: Didi Pineda MRN: [id number] Visit Information Date & Time Provider Department Dept. Phone Encounter #  
 2018  8:45 AM Ad Moy MD [address] [place] [address] Primary Care [phone] [id number] Your Appointments 2018  8:00 AM  
ROUTINE CARE with Ad Moy MD  
[facility] ([facility]) Appt Note: f/u on dm /18smc; f/u on dm /18smc  
 117 [facility]. [address] [place] [address]  
[phone]  
  
   
 [address] [place] [address] [address] Upcoming Health Maintenance Date Due Shingrix Vaccine Age 50> (1 of 2) 10/6/1993 Influenza Age 5 to Adult 2018 HEMOGLOBIN A1C Q6M 2019 FOOT EXAM Q1 2019 MICROALBUMIN Q1 2019 EYE EXAM RETINAL OR DILATED Q1 2019 LIPID PANEL Q1 2019 MEDICARE YEARLY EXAM 2019 GLAUCOMA SCREENING Q2Y 2020 COLONOSCOPY 2025 DTaP/Tdap/Td series (2 - Td) 6/15/2027 Allergies as of 2018  Review Complete On: 2018 By: Bin Castillo LPN Severity Noted Reaction Type Reactions Lipitor [Atorvastatin]  10/23/2013    Itching Current Immunizations  Reviewed on 2018 Name Date Influenza High Dose Vaccine PF 10/16/2017, 2016 Influenza Vaccine 10/23/2013 Influenza Vaccine Em Buerger) 2014 Influenza Vaccine (Quad) PF 10/28/2015 Influenza Vaccine (Tri) Adjuvanted  Incomplete Pneumococcal Conjugate (PCV-13) 2016 Pneumococcal Polysaccharide (PPSV-23) 2014 Tdap 6/15/2017 Not reviewed this visit You Were Diagnosed With   
  
 Codes Comments Lymphedema    -  Primary ICD-10-CM: I89.0 ICD-9-CM: 386.4 Left shoulder pain, unspecified chronicity     ICD-10-CM: M25.512 ICD-9-CM: 719.41   
 Type 2 diabetes mellitus with hyperglycemia, without long-term current use of insulin (HCC)     ICD-10-CM: E11.65 ICD-9-CM: 250.00, 790.29 Encounter for immunization     ICD-10-CM: Y65 ICD-9-CM: V03.89 Erectile dysfunction due to arterial insufficiency     ICD-10-CM: N52.01 
ICD-9-CM: 607.84 Vitals BP Pulse Temp Resp Height(growth percentile) Weight(growth percentile) 136/70 (BP 1 Location: Left arm, BP Patient Position: Sitting) (!) 54 96.1 °F (35.6 °C) (Oral) 18 5' 6\" (1.676 m) 218 lb (98.9 kg) SpO2 BMI Smoking Status 99% 35.19 kg/m2 Never Smoker BMI and BSA Data Body Mass Index Body Surface Area  
 35.19 kg/m 2 2.15 m 2 Preferred Pharmacy Pharmacy Name Phone [facility], [address] [phone] Your Updated Medication List  
  
   
This list is accurate as of 9/28/18  8:54 AM.  Always use your most recent med list.  
  
  
  
  
 ACCU-CHEK PHYLLIS PLUS TEST STRP strip Generic drug:  glucose blood VI test strips  
  
 captopril 25 mg tablet Commonly known as:  CAPOTEN  
TAKE 1 TABLET BY MOUTH TWO (2) TIMES A DAY FOR BLOOD PRESSURE  
  
 dexamethasone 0.1 % ophthalmic solution Commonly known as:  DECADRON  
  
 furosemide 20 mg tablet Commonly known as:  LASIX As needed for swelling  
  
 hydroCHLOROthiazide 25 mg tablet Commonly known as:  HYDRODIURIL Take 1 Tab by mouth daily. metFORMIN 500 mg tablet Commonly known as:  GLUCOPHAGE Take 1 Tab by mouth three (3) times daily (with meals). metoprolol succinate 100 mg tablet Commonly known as:  TOPROL-XL  
TAKE 1 TABLET BY MOUTH DAILY FOR HEART & BLOOD PRESSURE  
  
 naproxen 500 mg tablet Commonly known as:  NAPROSYN  
TAKE 1 TABLET BY MOUTH TWO (2) TIMES DAILY (WITH MEALS). AS NEEDED FOR GOUT  
  
 triamcinolone acetonide 0.1 % topical cream  
Commonly known as:  KENALOG Apply  to affected area two (2) times a day. use thin layer to bumps  
  
 vardenafil 20 mg tablet Commonly known as:  LEVITRA Take 20 mg by mouth as needed. Prescriptions Printed Refills  
 vardenafil (LEVITRA) 20 mg tablet 5 Sig: Take 20 mg by mouth as needed. Class: Print Route: Oral  
  
Prescriptions Sent to Pharmacy Refills  
 metoprolol succinate (TOPROL-XL) 100 mg tablet 3 Sig: TAKE 1 TABLET BY MOUTH DAILY FOR HEART & BLOOD PRESSURE Class: Normal  
 Pharmacy: [facility] [address], [address] [place] [address] #: [phone] We Performed the Following DRAIN/INJECT LARGE JOINT/BURSA [id number] CPT(R)] INFLUENZA VACCINE INACTIVATED (IIV), SUBUNIT, ADJUVANTED, IM [id number] CPT(R)] Introducing [facility]! [facility] introduces Yedda patient portal. Now you can access parts of your medical record, email your doctor's office, and request medication refills online. 1. In your internet browser, go to [email or web address] 2. Click on the First Time User? Click Here link in the Sign In box. You will see the New Member Sign Up page. 3. Enter your Yedda Access Code exactly as it appears below. You will not need to use this code after youve completed the sign-up process. If you do not sign up before the expiration date, you must request a new code. · Yedda Access Code: [email or web address] Expires: 11/12/2018  8:51 AM 
 
4. Enter the last four digits of your Social Security Number (xxxx) and Date of Birth (mm/dd/yyyy) as indicated and click Submit. You will be taken to the next sign-up page. 5. Create a Mandoyot ID. This will be your Yedda login ID and cannot be changed, so think of one that is secure and easy to remember. 6. Create a Yedda password. You can change your password at any time. 7. Enter your Password Reset Question and Answer.  This can be used at a later time if you forget your password. 8. Enter your e-mail address. You will receive e-mail notification when new information is available in [address] 9. Click Sign Up. You can now view and download portions of your medical record. 10. Click the Download Summary menu link to download a portable copy of your medical information. If you have questions, please visit the Frequently Asked Questions section of the rubberit website. Remember, rubberit is NOT to be used for urgent needs. For medical emergencies, dial 911. Now available from your iPhone and Android! Please provide this summary of care documentation to your next provider. Your primary care clinician is listed as Haydee Lopez. If you have any questions after today's visit, please call [phone].

## 2018-09-28 NOTE — PROGRESS NOTES
Chief Complaint Patient presents with  Leg Swelling R/lower leg edema, reddness I have reviewed the patient's medical history in detail and updated the computerized patient record. Health Maintenance reviewed. 1. Have you been to the ER, urgent care clinic since your last visit? Hospitalized since your last visit?no 2. Have you seen or consulted any other health care providers outside of the [address] [place] [address] Damian since your last visit? Include any pap smears or colon screening. No 
 
Encouraged pt to discuss pt's wishes with spouse/partner/family and bring them in the next appt to follow thru with the Advanced Directive Fall Risk Assessment, last 12 mths 9/28/2018 Able to walk? Yes Fall in past 12 months? No  
 
 
PHQ over the last two weeks 9/28/2018 Little interest or pleasure in doing things Several days Feeling down, depressed, irritable, or hopeless - Total Score PHQ 2 - Abuse Screening Questionnaire 1/16/2018 Do you ever feel afraid of your partner? Kourtney Martin Are you in a relationship with someone who physically or mentally threatens you? Kourtney Martin Is it safe for you to go home? Y  
 
 

## 2018-11-16 ENCOUNTER — OFFICE VISIT (OUTPATIENT)
Dept: INTERNAL MEDICINE CLINIC | Age: 73
End: 2018-11-16

## 2018-11-16 VITALS
HEIGHT: 66 IN | TEMPERATURE: 96.3 F | RESPIRATION RATE: 16 BRPM | BODY MASS INDEX: 34.23 KG/M2 | OXYGEN SATURATION: 98 % | SYSTOLIC BLOOD PRESSURE: 138 MMHG | DIASTOLIC BLOOD PRESSURE: 70 MMHG | HEART RATE: 60 BPM | WEIGHT: 213 LBS

## 2018-11-16 DIAGNOSIS — I89.0 LYMPHEDEMA: ICD-10-CM

## 2018-11-16 DIAGNOSIS — N20.0 KIDNEY STONE: ICD-10-CM

## 2018-11-16 DIAGNOSIS — E11.65 TYPE 2 DIABETES MELLITUS WITH HYPERGLYCEMIA, WITHOUT LONG-TERM CURRENT USE OF INSULIN (HCC): Primary | ICD-10-CM

## 2018-11-16 DIAGNOSIS — I10 ESSENTIAL HYPERTENSION: ICD-10-CM

## 2018-11-16 DIAGNOSIS — C61 PROSTATE CANCER (HCC): ICD-10-CM

## 2018-11-16 DIAGNOSIS — N18.2 CRI (CHRONIC RENAL INSUFFICIENCY), STAGE 2 (MILD): ICD-10-CM

## 2018-11-16 LAB — HBA1C MFR BLD HPLC: 7.9 %

## 2018-11-16 RX ORDER — HYDROCHLOROTHIAZIDE 25 MG/1
25 TABLET ORAL DAILY
Qty: 90 TAB | Refills: 3 | Status: SHIPPED | OUTPATIENT
Start: 2018-11-16 | End: 2019-02-05 | Stop reason: SDUPTHER

## 2018-11-16 RX ORDER — CAPTOPRIL 25 MG/1
TABLET ORAL
Qty: 180 TAB | Refills: 3 | Status: SHIPPED | OUTPATIENT
Start: 2018-11-16 | End: 2019-01-01 | Stop reason: SDUPTHER

## 2018-11-16 NOTE — PROGRESS NOTES
Chief Complaint Patient presents with  Hypertension  
  follow up I have reviewed the patient's medical history in detail and updated the computerized patient record. Health Maintenance reviewed. 1. Have you been to the ER, urgent care clinic since your last visit? Hospitalized since your last visit?no 2. Have you seen or consulted any other health care providers outside of the [address] [place] [address] Damian since your last visit? Include any pap smears or colon screening. No 
 
 
Encouraged pt to discuss pt's wishes with spouse/partner/family and bring them in the next appt to follow thru with the Advanced Directive Fall Risk Assessment, last 12 mths 11/16/2018 Able to walk? Yes Fall in past 12 months? No  
 
 
PHQ over the last two weeks 11/16/2018 Little interest or pleasure in doing things Several days Feeling down, depressed, irritable, or hopeless Several days Total Score PHQ 2 2 Abuse Screening Questionnaire 1/16/2018 Do you ever feel afraid of your partner? Abby Hernández Are you in a relationship with someone who physically or mentally threatens you? Abby Hernández Is it safe for you to go home? Y  
 
 

## 2019-01-01 ENCOUNTER — TELEPHONE (OUTPATIENT)
Dept: INTERNAL MEDICINE CLINIC | Age: 74
End: 2019-01-01

## 2019-01-01 ENCOUNTER — OFFICE VISIT (OUTPATIENT)
Dept: INTERNAL MEDICINE CLINIC | Age: 74
End: 2019-01-01

## 2019-01-01 ENCOUNTER — DOCUMENTATION ONLY (OUTPATIENT)
Dept: INTERNAL MEDICINE CLINIC | Age: 74
End: 2019-01-01

## 2019-01-01 VITALS
SYSTOLIC BLOOD PRESSURE: 139 MMHG | BODY MASS INDEX: 32.95 KG/M2 | OXYGEN SATURATION: 97 % | HEART RATE: 60 BPM | HEIGHT: 66 IN | DIASTOLIC BLOOD PRESSURE: 68 MMHG | RESPIRATION RATE: 16 BRPM | TEMPERATURE: 98.4 F | WEIGHT: 205 LBS

## 2019-01-01 VITALS
SYSTOLIC BLOOD PRESSURE: 110 MMHG | HEIGHT: 66 IN | TEMPERATURE: 96.1 F | HEART RATE: 58 BPM | DIASTOLIC BLOOD PRESSURE: 63 MMHG | BODY MASS INDEX: 33.91 KG/M2 | OXYGEN SATURATION: 97 % | WEIGHT: 211 LBS | RESPIRATION RATE: 16 BRPM

## 2019-01-01 VITALS
HEIGHT: 66 IN | DIASTOLIC BLOOD PRESSURE: 82 MMHG | TEMPERATURE: 97.8 F | WEIGHT: 206 LBS | RESPIRATION RATE: 16 BRPM | BODY MASS INDEX: 33.11 KG/M2 | OXYGEN SATURATION: 96 % | SYSTOLIC BLOOD PRESSURE: 136 MMHG | HEART RATE: 53 BPM

## 2019-01-01 DIAGNOSIS — E11.21 TYPE 2 DIABETES MELLITUS WITH NEPHROPATHY (HCC): ICD-10-CM

## 2019-01-01 DIAGNOSIS — Z00.00 MEDICARE ANNUAL WELLNESS VISIT, SUBSEQUENT: Primary | ICD-10-CM

## 2019-01-01 DIAGNOSIS — I89.0 LYMPHEDEMA: ICD-10-CM

## 2019-01-01 DIAGNOSIS — Z13.39 SCREENING FOR ALCOHOLISM: ICD-10-CM

## 2019-01-01 DIAGNOSIS — M10.00 IDIOPATHIC GOUT, UNSPECIFIED CHRONICITY, UNSPECIFIED SITE: ICD-10-CM

## 2019-01-01 DIAGNOSIS — M10.00 IDIOPATHIC GOUT, UNSPECIFIED CHRONICITY, UNSPECIFIED SITE: Primary | ICD-10-CM

## 2019-01-01 DIAGNOSIS — Z23 ENCOUNTER FOR IMMUNIZATION: ICD-10-CM

## 2019-01-01 DIAGNOSIS — E11.65 TYPE 2 DIABETES MELLITUS WITH HYPERGLYCEMIA, WITHOUT LONG-TERM CURRENT USE OF INSULIN (HCC): ICD-10-CM

## 2019-01-01 DIAGNOSIS — I10 ESSENTIAL HYPERTENSION: Primary | ICD-10-CM

## 2019-01-01 DIAGNOSIS — C61 PROSTATE CANCER (HCC): ICD-10-CM

## 2019-01-01 DIAGNOSIS — I10 ESSENTIAL HYPERTENSION: ICD-10-CM

## 2019-01-01 DIAGNOSIS — Z13.31 SCREENING FOR DEPRESSION: ICD-10-CM

## 2019-01-01 DIAGNOSIS — Z13.6 SCREENING FOR ISCHEMIC HEART DISEASE: ICD-10-CM

## 2019-01-01 DIAGNOSIS — Z13.220 SCREENING CHOLESTEROL LEVEL: ICD-10-CM

## 2019-01-01 DIAGNOSIS — Z13.1 SCREENING FOR DIABETES MELLITUS: ICD-10-CM

## 2019-01-01 LAB
BUN SERPL-MCNC: 14 MG/DL (ref 8–27)
BUN SERPL-MCNC: 20 MG/DL (ref 8–27)
BUN SERPL-MCNC: 31 MG/DL (ref 8–27)
BUN/CREAT SERPL: 12 (ref 10–24)
BUN/CREAT SERPL: 14 (ref 10–24)
BUN/CREAT SERPL: 22 (ref 10–24)
CALCIUM SERPL-MCNC: 10.1 MG/DL (ref 8.6–10.2)
CALCIUM SERPL-MCNC: 8.6 MG/DL (ref 8.6–10.2)
CALCIUM SERPL-MCNC: 9 MG/DL (ref 8.6–10.2)
CHLORIDE SERPL-SCNC: 101 MMOL/L (ref 96–106)
CHLORIDE SERPL-SCNC: 107 MMOL/L (ref 96–106)
CHLORIDE SERPL-SCNC: 97 MMOL/L (ref 96–106)
CHOLEST SERPL-MCNC: 243 MG/DL (ref 100–199)
CO2 SERPL-SCNC: 25 MMOL/L (ref 20–29)
CO2 SERPL-SCNC: 26 MMOL/L (ref 20–29)
CO2 SERPL-SCNC: 28 MMOL/L (ref 20–29)
CREAT SERPL-MCNC: 1.14 MG/DL (ref 0.76–1.27)
CREAT SERPL-MCNC: 1.42 MG/DL (ref 0.76–1.27)
CREAT SERPL-MCNC: 1.43 MG/DL (ref 0.76–1.27)
CREAT UR-MCNC: NORMAL MG/DL
EST. AVERAGE GLUCOSE BLD GHB EST-MCNC: 154 MG/DL
EST. AVERAGE GLUCOSE BLD GHB EST-MCNC: 160 MG/DL
GLUCOSE SERPL-MCNC: 126 MG/DL (ref 65–99)
GLUCOSE SERPL-MCNC: 128 MG/DL (ref 65–99)
GLUCOSE SERPL-MCNC: 163 MG/DL (ref 65–99)
HBA1C MFR BLD: 7 % (ref 4.8–5.6)
HBA1C MFR BLD: 7.2 % (ref 4.8–5.6)
HDLC SERPL-MCNC: 62 MG/DL
INTERPRETATION, 910389: NORMAL
INTERPRETATION: NORMAL
INTERPRETATION: NORMAL
LDLC SERPL CALC-MCNC: 146 MG/DL (ref 0–99)
Lab: NORMAL
MICROALBUMIN UR-MCNC: NORMAL
PDF IMAGE, 910387: NORMAL
POTASSIUM SERPL-SCNC: 3.9 MMOL/L (ref 3.5–5.2)
POTASSIUM SERPL-SCNC: 4 MMOL/L (ref 3.5–5.2)
POTASSIUM SERPL-SCNC: 4.2 MMOL/L (ref 3.5–5.2)
SODIUM SERPL-SCNC: 141 MMOL/L (ref 134–144)
SODIUM SERPL-SCNC: 143 MMOL/L (ref 134–144)
SODIUM SERPL-SCNC: 145 MMOL/L (ref 134–144)
TRIGL SERPL-MCNC: 174 MG/DL (ref 0–149)
URATE SERPL-MCNC: 7.6 MG/DL (ref 3.7–8.6)
VLDLC SERPL CALC-MCNC: 35 MG/DL (ref 5–40)

## 2019-01-01 RX ORDER — LISINOPRIL 10 MG/1
10 TABLET ORAL DAILY
Qty: 90 TAB | Refills: 3 | Status: SHIPPED | OUTPATIENT
Start: 2019-01-01

## 2019-01-01 RX ORDER — CAPTOPRIL 25 MG/1
25 TABLET ORAL 2 TIMES DAILY
Qty: 180 TAB | Refills: 2 | Status: SHIPPED | OUTPATIENT
Start: 2019-01-01 | End: 2019-01-01

## 2019-01-01 RX ORDER — METOPROLOL SUCCINATE 100 MG/1
TABLET, EXTENDED RELEASE ORAL
Qty: 90 TAB | Refills: 3 | Status: SHIPPED | OUTPATIENT
Start: 2019-01-01 | End: 2020-01-01 | Stop reason: SDUPTHER

## 2019-01-01 RX ORDER — HYDROCHLOROTHIAZIDE 25 MG/1
TABLET ORAL
Qty: 90 TAB | Refills: 3 | Status: SHIPPED | OUTPATIENT
Start: 2019-01-01 | End: 2020-01-01 | Stop reason: SDUPTHER

## 2019-01-01 RX ORDER — PREDNISONE 20 MG/1
40 TABLET ORAL
Qty: 10 TAB | Refills: 0 | Status: SHIPPED | OUTPATIENT
Start: 2019-01-01 | End: 2019-01-01

## 2019-01-01 RX ORDER — AMPICILLIN TRIHYDRATE 250 MG
600 CAPSULE ORAL
COMMUNITY

## 2019-01-01 RX ORDER — COLCHICINE 0.6 MG/1
0.6 TABLET ORAL DAILY
Qty: 10 TAB | Refills: 1 | Status: SHIPPED | OUTPATIENT
Start: 2019-01-01 | End: 2020-01-01 | Stop reason: SDUPTHER

## 2019-01-01 RX ORDER — LISINOPRIL 10 MG/1
10 TABLET ORAL DAILY
Qty: 90 TAB | Refills: 1 | Status: SHIPPED | OUTPATIENT
Start: 2019-01-01 | End: 2019-01-01 | Stop reason: ALTCHOICE

## 2019-01-01 RX ORDER — FUROSEMIDE 20 MG/1
TABLET ORAL
Qty: 30 TAB | Refills: 2 | Status: SHIPPED | OUTPATIENT
Start: 2019-01-01

## 2019-01-01 RX ORDER — LISINOPRIL 10 MG/1
TABLET ORAL DAILY
COMMUNITY
End: 2019-01-01 | Stop reason: SDUPTHER

## 2019-02-05 RX ORDER — HYDROCHLOROTHIAZIDE 25 MG/1
TABLET ORAL
Qty: 90 TAB | Refills: 3 | Status: SHIPPED | OUTPATIENT
Start: 2019-02-05 | End: 2019-01-01 | Stop reason: SDUPTHER

## 2019-02-19 ENCOUNTER — OFFICE VISIT (OUTPATIENT)
Dept: INTERNAL MEDICINE CLINIC | Age: 74
End: 2019-02-19

## 2019-02-19 VITALS
SYSTOLIC BLOOD PRESSURE: 139 MMHG | BODY MASS INDEX: 33.43 KG/M2 | TEMPERATURE: 97.3 F | WEIGHT: 208 LBS | HEART RATE: 48 BPM | OXYGEN SATURATION: 97 % | RESPIRATION RATE: 18 BRPM | HEIGHT: 66 IN | DIASTOLIC BLOOD PRESSURE: 89 MMHG

## 2019-02-19 DIAGNOSIS — C61 PROSTATE CANCER (HCC): ICD-10-CM

## 2019-02-19 DIAGNOSIS — N18.2 CRI (CHRONIC RENAL INSUFFICIENCY), STAGE 2 (MILD): ICD-10-CM

## 2019-02-19 DIAGNOSIS — I89.0 LYMPHEDEMA: ICD-10-CM

## 2019-02-19 DIAGNOSIS — M10.00 IDIOPATHIC GOUT, UNSPECIFIED CHRONICITY, UNSPECIFIED SITE: ICD-10-CM

## 2019-02-19 DIAGNOSIS — E11.65 TYPE 2 DIABETES MELLITUS WITH HYPERGLYCEMIA, WITHOUT LONG-TERM CURRENT USE OF INSULIN (HCC): Primary | ICD-10-CM

## 2019-02-19 DIAGNOSIS — I10 ESSENTIAL HYPERTENSION: ICD-10-CM

## 2019-02-19 DIAGNOSIS — M19.049 HAND ARTHRITIS: ICD-10-CM

## 2019-02-19 DIAGNOSIS — E11.21 TYPE 2 DIABETES MELLITUS WITH NEPHROPATHY (HCC): ICD-10-CM

## 2019-02-19 RX ORDER — METFORMIN HYDROCHLORIDE 500 MG/1
1000 TABLET ORAL 2 TIMES DAILY WITH MEALS
Qty: 360 TAB | Refills: 3 | Status: SHIPPED | OUTPATIENT
Start: 2019-02-19

## 2019-02-19 RX ORDER — DICLOFENAC SODIUM 10 MG/G
GEL TOPICAL 4 TIMES DAILY
Qty: 1 EACH | Refills: 5 | Status: SHIPPED | OUTPATIENT
Start: 2019-02-19

## 2019-02-19 NOTE — PROGRESS NOTES
Chief Complaint Patient presents with  Hypertension  
  follow up I have reviewed the patient's medical history in detail and updated the computerized patient record. Health Maintenance reviewed. 1. Have you been to the ER, urgent care clinic since your last visit? Hospitalized since your last visit?no 2. Have you seen or consulted any other health care providers outside of the [address] [place] [address] Damian since your last visit? Include any pap smears or colon screening. No 
 
 
Encouraged pt to discuss pt's wishes with spouse/partner/family and bring them in the next appt to follow thru with the Advanced Directive Fall Risk Assessment, last 12 mths 2/19/2019 Able to walk? No  
Fall in past 12 months? -  
 
 
3 most recent PHQ Screens 2/19/2019 Little interest or pleasure in doing things Several days Feeling down, depressed, irritable, or hopeless Several days Total Score PHQ 2 2 Abuse Screening Questionnaire 2/19/2019 Do you ever feel afraid of your partner? Delia [place] Are you in a relationship with someone who physically or mentally threatens you? Delia [place] Is it safe for you to go home? Y  
 
 

## 2019-02-19 NOTE — PROGRESS NOTES
Subjective:  
 
Annalisa Prater is a 74 y.o. male seen for follow-up of diabetes. He has had hypoglycemic attacks. .no Blood sugar control has been OK last A1C. 7.9 He has diabetes and hypertension. Annalisa Prater has the additional concern of doing lots of house work Prostate Ca in remission. Wants arthritis cream for his hands. Occa usage of naprosyn for gout pain, not too bad. Diabetic Review of Systems: no polyuria or polydipsia, no chest pain, dyspnea or TIA's, no numbness, tingling or pain in extremities, no hypoglycemia, no medication side effects noted. Allergies Allergen Reactions  Lipitor [Atorvastatin] Itching Diet and Lifestyle: follows a diabetic diet regularly, nonsmoker. Patient Active Problem List  
 Diagnosis Date Noted  Severe obesity (BMI 35.0-39. 9) with comorbidity ([facility] 04/18/2018  Type 2 diabetes mellitus with nephropathy ([facility].) 01/16/2018  Type 2 diabetes mellitus with hyperglycemia, without long-term current use of insulin ([facility].) 01/30/2017  Prostate cancer ([facility].) 02/23/2016  Essential hypertension 05/28/2015  Gout 10/23/2013 Current Outpatient Medications Medication Sig Dispense Refill  metFORMIN (GLUCOPHAGE) 500 mg tablet Take 2 Tabs by mouth two (2) times daily (with meals). 360 Tab 3  
 hydroCHLOROthiazide (HYDRODIURIL) 25 mg tablet TAKE 1 TABLET BY MOUTH DAILY. 90 Tab 3  captopril (CAPOTEN) 25 mg tablet TAKE 1 TABLET BY MOUTH TWO (2) TIMES A DAY FOR BLOOD PRESSURE 180 Tab 3  
 metoprolol succinate (TOPROL-XL) 100 mg tablet TAKE 1 TABLET BY MOUTH DAILY FOR HEART & BLOOD PRESSURE 90 Tab 3  furosemide (LASIX) 20 mg tablet As needed for swelling 30 Tab 2  
 naproxen (NAPROSYN) 500 mg tablet TAKE 1 TABLET BY MOUTH TWO (2) TIMES DAILY (WITH MEALS). AS NEEDED FOR GOUT 60 Tab 5  vardenafil (LEVITRA) 20 mg tablet Take 20 mg by mouth as needed. 2 Tab 5  ACCU-CHEK PHYLLIS PLUS TEST STRP strip  dexamethasone (DECADRON) 0.1 % ophthalmic solution Allergies Allergen Reactions  Lipitor [Atorvastatin] Itching Social History Tobacco Use  Smoking status: Never Smoker  Smokeless tobacco: Former User  Tobacco comment: snuff Substance Use Topics  Alcohol use: No  
  Alcohol/week: 0.0 oz  
  Comment: seldom Lab Results Component Value Date/Time Hemoglobin A1c 7.4 (H) 07/16/2018 08:40 AM  
 Hemoglobin A1c 9.1 (H) 04/18/2018 08:43 AM  
 Hemoglobin A1c 7.7 (H) 01/16/2018 08:25 AM  
 Glucose 117 (H) 07/16/2018 08:40 AM  
 Glucose (POC) 122 (H) 01/07/2015 08:53 AM  
 Microalb/Creat ratio (ug/mg creat.) 133.5 (H) 04/18/2018 08:43 AM  
 LDL, calculated 124 (H) 07/16/2018 08:40 AM  
 Creatinine 1.38 (H) 07/16/2018 08:40 AM  
  
Lab Results Component Value Date/Time ALT (SGPT) 20 05/30/2018 08:55 AM  
 AST (SGOT) 31 05/30/2018 08:55 AM  
 Alk. phosphatase 56 05/30/2018 08:55 AM  
 Bilirubin, total 0.4 05/30/2018 08:55 AM  
 Albumin 3.8 05/30/2018 08:55 AM  
 Protein, total 7.8 05/30/2018 08:55 AM  
 PLATELET 169 27/67/1363 08:55 AM  
 
Lab Results Component Value Date/Time GFR est non-AA 50 (L) 07/16/2018 08:40 AM  
 GFR est AA 58 (L) 07/16/2018 08:40 AM  
 Creatinine 1.38 (H) 07/16/2018 08:40 AM  
 BUN 22 07/16/2018 08:40 AM  
 Sodium 140 07/16/2018 08:40 AM  
 Potassium 3.9 07/16/2018 08:40 AM  
 Chloride 99 07/16/2018 08:40 AM  
 CO2 23 07/16/2018 08:40 AM  
 
Lab Results Component Value Date/Time Glucose 117 (H) 07/16/2018 08:40 AM  
 Glucose (POC) 122 (H) 01/07/2015 08:53 AM  
   
 
Review of Systems Pertinent items are noted in HPI. Objective:  
 
Significant for the following:  
 
Visit Vitals /89 (BP 1 Location: Left arm, BP Patient Position: Sitting) Pulse (!) 48 Temp 97.3 °F (36.3 °C) (Oral) Resp 18 Ht 5' 6\" (1.676 m) Wt 208 lb (94.3 kg) SpO2 97% BMI 33.57 kg/m² Appearance: alert, well appearing, and in no distress. Exam: heart sounds normal rate, regular rhythm, normal S1, S2, no murmurs, rubs, clicks or gallops, chest clear Foot exam: deferred Lab review: orders written for new lab studies as appropriate; see orders. Assessment/Plan:  
 
Follow-up diabetes well controlled, stable. Diabetic issues reviewed with him: all medications, side effects and compliance discussed carefully, annual eye examinations at Ophthalmology discussed and glycohemoglobin and other lab monitoring discussed. Chronic Conditions Addressed Today 1. Type 2 diabetes mellitus with nephropathy ([facility] Relevant Medications  
  metFORMIN (GLUCOPHAGE) 500 mg tablet Other Relevant Orders CO HANDLG&/OR CONVEY OF SPEC FOR TR OFFICE TO LAB 2. Type 2 diabetes mellitus with hyperglycemia, without long-term current use of insulin (HCC) - Primary Relevant Medications  
  metFORMIN (GLUCOPHAGE) 500 mg tablet Other Relevant Orders HEMOGLOBIN A1C WITH EAG  
  CO HANDLG&/OR CONVEY OF SPEC FOR TR OFFICE TO LAB 3. Prostate cancer ([facility] Overview Seed implantation 2016 [employer] 4. Gout Relevant Orders CO HANDLG&/OR CONVEY OF SPEC FOR TR OFFICE TO LAB 5. Essential hypertension Relevant Orders METABOLIC PANEL, BASIC  
  CO HANDLG&/OR CONVEY OF SPEC FOR TR OFFICE TO LAB Acute Diagnoses Addressed Today Lymphedema Relevant Orders CO HANDLG&/OR CONVEY OF SPEC FOR TR OFFICE TO LAB  
 CRI (chronic renal insufficiency), stage 2 (mild) Relevant Orders CBC W/O DIFF  
   
 CO HANDLG&/OR CONVEY OF SPEC FOR TR OFFICE TO LAB Hand arthritis Relevant Medications  
   
 diclofenac (VOLTAREN) 1 % gel Orders Placed This Encounter  HEMOGLOBIN A1C WITH EAG  
 METABOLIC PANEL, BASIC  CBC W/O DIFF  
 CO HANDLG&/OR CONVEY OF SPEC FOR TR OFFICE TO LAB  metFORMIN (GLUCOPHAGE) 500 mg tablet Sig: Take 2 Tabs by mouth two (2) times daily (with meals). Dispense:  360 Tab Refill:  3  
 diclofenac (VOLTAREN) 1 % gel Sig: Apply  to affected area four (4) times daily. Dispense:  1 Each Refill:  5 Dm and HTN stable OK arthritis cream prostate CA in remission.  
 
Follow-up Disposition: 
Return in about 5 months (around 7/19/2019) for medicare annual.

## 2019-02-20 LAB
BUN SERPL-MCNC: 17 MG/DL (ref 8–27)
BUN/CREAT SERPL: 13 (ref 10–24)
CALCIUM SERPL-MCNC: 9.4 MG/DL (ref 8.6–10.2)
CHLORIDE SERPL-SCNC: 100 MMOL/L (ref 96–106)
CO2 SERPL-SCNC: 27 MMOL/L (ref 20–29)
CREAT SERPL-MCNC: 1.28 MG/DL (ref 0.76–1.27)
ERYTHROCYTE [DISTWIDTH] IN BLOOD BY AUTOMATED COUNT: 14.1 % (ref 12.3–15.4)
EST. AVERAGE GLUCOSE BLD GHB EST-MCNC: 163 MG/DL
GLUCOSE SERPL-MCNC: 125 MG/DL (ref 65–99)
HBA1C MFR BLD: 7.3 % (ref 4.8–5.6)
HCT VFR BLD AUTO: 38.5 % (ref 37.5–51)
HGB BLD-MCNC: 12.4 G/DL (ref 13–17.7)
INTERPRETATION: NORMAL
Lab: NORMAL
MCH RBC QN AUTO: 26.7 PG (ref 26.6–33)
MCHC RBC AUTO-ENTMCNC: 32.2 G/DL (ref 31.5–35.7)
MCV RBC AUTO: 83 FL (ref 79–97)
PLATELET # BLD AUTO: 227 X10E3/UL (ref 150–379)
POTASSIUM SERPL-SCNC: 3.7 MMOL/L (ref 3.5–5.2)
RBC # BLD AUTO: 4.64 X10E6/UL (ref 4.14–5.8)
SODIUM SERPL-SCNC: 143 MMOL/L (ref 134–144)
WBC # BLD AUTO: 5.9 X10E3/UL (ref 3.4–10.8)

## 2019-07-19 NOTE — PROGRESS NOTES
Chief Complaint   Patient presents with    Hypertension     follow up     I have reviewed the patient's medical history in detail and updated the computerized patient record. Health Maintenance reviewed. 1. Have you been to the ER, urgent care clinic since your last visit? Hospitalized since your last visit?no    2. Have you seen or consulted any other health care providers outside of the [address] [place] [address] Damian since your last visit? Include any pap smears or colon screening. No    Encouraged pt to discuss pt's wishes with spouse/partner/family and bring them in the next appt to follow thru with the Advanced Directive    Fall Risk Assessment, last 12 mths 4/18/2018   Able to walk? Yes   Fall in past 12 months? No       PHQ over the last two weeks 4/18/2018   Little interest or pleasure in doing things Several days   Feeling down, depressed or hopeless Several days   Total Score PHQ 2 2       Abuse Screening Questionnaire 1/16/2018   Do you ever feel afraid of your partner? N   Are you in a relationship with someone who physically or mentally threatens you? N   Is it safe for you to go home?  Y       ADL Assessment 1/16/2018   Feeding yourself No Help Needed   Getting from bed to chair No Help Needed   Getting dressed No Help Needed   Bathing or showering No Help Needed   Walk across the room (includes cane/walker) No Help Needed   Using the telphone No Help Needed   Taking your medications No Help Needed   Preparing meals No Help Needed   Managing money (expenses/bills) No Help Needed   Moderately strenuous housework (laundry) No Help Needed   Shopping for personal items (toiletries/medicines) No Help Needed   Shopping for groceries No Help Needed   Driving No Help Needed   Climbing a flight of stairs No Help Needed   Getting to places beyond walking distances No Help Needed Detail Level: Simple General Sunscreen Counseling: I recommended a broad spectrum sunscreen with a SPF of 30 or higher.  I explained that SPF 30 sunscreens block approximately 97 percent of the sun's harmful rays.  Sunscreens should be applied at least 15 minutes prior to expected sun exposure and then every 2 hours after that as long as sun exposure continues. If swimming or exercising sunscreen should be reapplied every 45 minutes to an hour after getting wet or sweating.  One ounce, or the equivalent of a shot glass full of sunscreen, is adequate to protect the skin not covered by a bathing suit. I also recommended a lip balm with a sunscreen as well. Sun protective clothing can be used in lieu of sunscreen but must be worn the entire time you are exposed to the sun's rays.

## 2019-07-22 PROBLEM — I89.0 LYMPHEDEMA: Status: ACTIVE | Noted: 2019-01-01

## 2019-07-22 NOTE — PROGRESS NOTES
This is the Subsequent Medicare Annual Wellness Exam, performed 12 months or more after the Initial AWV or the last Subsequent AWV    I have reviewed the patient's medical history in detail and updated the computerized patient record. History     Stays busy cutting grass, some head congestion today. Has some lower leg swelling going to take some lasix, takes rarely. Min c/o gout  Ins co says captopril too expensive. He wants to stay with it. Past Medical History:   Diagnosis Date    Arthritis     Cancer ([facility]     Prostate    Diabetes ([facility]     Gout     Hypertension     Kidney stone       Past Surgical History:   Procedure Laterality Date    [facility]  1/7/2015         HX APPENDECTOMY      burst    HX CATARACT REMOVAL Left 07/01/2018    HX ORTHOPAEDIC      toe     Current Outpatient Medications   Medication Sig Dispense Refill    captopril (CAPOTEN) 25 mg tablet TAKE 1 TABLET BY MOUTH TWO (2) TIMES A DAY FOR BLOOD PRESSURE 180 Tab 2    naproxen (NAPROSYN) 500 mg tablet TAKE 1 TABLET BY MOUTH TWICE DAILY WITH MEALS AS NEEDED FOR GOUT 60 Tab 5    metFORMIN (GLUCOPHAGE) 500 mg tablet Take 2 Tabs by mouth two (2) times daily (with meals). 360 Tab 3    diclofenac (VOLTAREN) 1 % gel Apply  to affected area four (4) times daily. 1 Each 5    hydroCHLOROthiazide (HYDRODIURIL) 25 mg tablet TAKE 1 TABLET BY MOUTH DAILY. 90 Tab 3    metoprolol succinate (TOPROL-XL) 100 mg tablet TAKE 1 TABLET BY MOUTH DAILY FOR HEART & BLOOD PRESSURE 90 Tab 3    vardenafil (LEVITRA) 20 mg tablet Take 20 mg by mouth as needed. 2 Tab 5    furosemide (LASIX) 20 mg tablet As needed for swelling 30 Tab 2    ACCU-CHEK PHYLLIS PLUS TEST STRP strip       dexamethasone (DECADRON) 0.1 % ophthalmic solution        Allergies   Allergen Reactions    Lipitor [Atorvastatin] Itching     No family history on file.   Social History     Tobacco Use    Smoking status: Never Smoker    Smokeless tobacco: Former User    Tobacco comment: snuff   Substance Use Topics    Alcohol use: No     Alcohol/week: 0.0 standard drinks     Comment: seldom     Patient Active Problem List   Diagnosis Code    Gout M10.9    Essential hypertension I10    Prostate cancer ([facility] C61    Type 2 diabetes mellitus with hyperglycemia, without long-term current use of insulin (HCC) E11.65    Type 2 diabetes mellitus with nephropathy ([facility] E11.21    Severe obesity (BMI 35.0-39. 9) with comorbidity ([facility] E66.01       Depression Risk Factor Screening:     3 most recent PHQ Screens 7/22/2019   Little interest or pleasure in doing things Several days   Feeling down, depressed, irritable, or hopeless Several days   Total Score PHQ 2 2     Alcohol Risk Factor Screening: You do not drink alcohol or very rarely. Functional Ability and Level of Safety:   Hearing Loss  Hearing is good. Activities of Daily Living  The home contains: no safety equipment. Patient does total self care    Fall Risk  Fall Risk Assessment, last 12 mths 7/22/2019   Able to walk? Yes   Fall in past 12 months? No       Abuse Screen  Patient is not abused    Cognitive Screening   Evaluation of Cognitive Function:  Has your family/caregiver stated any concerns about your memory: no  Normal, Clock Drawing test    Patient Care Team   Patient Care Team:  Gucci Ortiz MD as PCP - General (Family Practice)  [facility] [place] [id number]    Visit Vitals  /82   Pulse (!) 53   Temp 97.8 °F (36.6 °C) (Oral)   Resp 16   Ht 5' 6\" (1.676 m)   Wt 206 lb (93.4 kg)   SpO2 96%   BMI 33.25 kg/m²     WD WN male NAD  Heart RRR without murmers clicks or rubs  Lungs CTA  Abdo soft nontender  Ext no edema    Assessment/Plan   Education and counseling provided:  Are appropriate based on today's review and evaluation  Cardiovascular screening blood test  Medical nutrition therapy for individuals with diabetes or renal disease      ICD-10-CM ICD-9-CM    1.  Medicare annual wellness visit, subsequent Z00.00 V70.0 PA HANDLG&/OR CONVEY OF SPEC FOR TR OFFICE TO LAB      COLLECTION VENOUS BLOOD,VENIPUNCTURE   2. Screening for alcoholism Z13.39 V79.1 PA ANNUAL ALCOHOL SCREEN 15 MIN      PA HANDLG&/OR CONVEY OF SPEC FOR TR OFFICE TO LAB      COLLECTION VENOUS BLOOD,VENIPUNCTURE   3. Screening for depression Z13.31 V79.0 DEPRESSION SCREEN ANNUAL      PA HANDLG&/OR CONVEY OF SPEC FOR TR OFFICE TO LAB      COLLECTION VENOUS BLOOD,VENIPUNCTURE   4. Screening for diabetes mellitus Z13.1 V77.1 PA HANDLG&/OR CONVEY OF SPEC FOR TR OFFICE TO LAB      COLLECTION VENOUS BLOOD,VENIPUNCTURE   5. Screening for ischemic heart disease Z13.6 V81.0 LIPID PANEL      PA HANDLG&/OR CONVEY OF SPEC FOR TR OFFICE TO LAB      COLLECTION VENOUS BLOOD,VENIPUNCTURE   6. Type 2 diabetes mellitus with nephropathy ([facility]) E11.21 250.40 HEMOGLOBIN A1C WITH EAG     583.81 MICROALBUMIN, UR, RAND W/ MICROALB/CREAT RATIO      PA HANDLG&/OR CONVEY OF SPEC FOR TR OFFICE TO LAB      COLLECTION VENOUS BLOOD,VENIPUNCTURE   7. Type 2 diabetes mellitus with hyperglycemia, without long-term current use of insulin ([facility]) E11.65 250.00 PA HANDLG&/OR CONVEY OF SPEC FOR TR OFFICE TO LAB     790.29 COLLECTION VENOUS BLOOD,VENIPUNCTURE   8. Prostate cancer ([facility]) C61 185 PA HANDLG&/OR CONVEY OF SPEC FOR TR OFFICE TO LAB      COLLECTION VENOUS BLOOD,VENIPUNCTURE   9. Essential hypertension L87 605.6 METABOLIC PANEL, BASIC      PA HANDLG&/OR CONVEY OF SPEC FOR TR OFFICE TO LAB      COLLECTION VENOUS BLOOD,VENIPUNCTURE   10. Idiopathic gout, unspecified chronicity, unspecified site M10.00 274.9 PA HANDLG&/OR CONVEY OF SPEC FOR TR OFFICE TO LAB      COLLECTION VENOUS BLOOD,VENIPUNCTURE   11. Lymphedema I89.0 457.1        Current Outpatient Medications   Medication Sig Dispense Refill    captopril (CAPOTEN) 25 mg tablet Take 1 Tab by mouth two (2) times a day.  180 Tab 2    naproxen (NAPROSYN) 500 mg tablet TAKE 1 TABLET BY MOUTH TWICE DAILY WITH MEALS AS NEEDED FOR GOUT 60 Tab 5    metFORMIN (GLUCOPHAGE) 500 mg tablet Take 2 Tabs by mouth two (2) times daily (with meals). 360 Tab 3    diclofenac (VOLTAREN) 1 % gel Apply  to affected area four (4) times daily. 1 Each 5    hydroCHLOROthiazide (HYDRODIURIL) 25 mg tablet TAKE 1 TABLET BY MOUTH DAILY. 90 Tab 3    metoprolol succinate (TOPROL-XL) 100 mg tablet TAKE 1 TABLET BY MOUTH DAILY FOR HEART & BLOOD PRESSURE 90 Tab 3    vardenafil (LEVITRA) 20 mg tablet Take 20 mg by mouth as needed. 2 Tab 5    furosemide (LASIX) 20 mg tablet As needed for swelling 30 Tab 2     Discussed change to lisinopril he dec  HTN stbale  Dm stable labs to eval  Edema cont diuretic  Prostate CA in remission,    Health Maintenance Due   Topic Date Due    MICROALBUMIN Q1  04/18/2019    LIPID PANEL Q1  07/16/2019    MEDICARE YEARLY EXAM  07/17/2019    HEMOGLOBIN A1C Q6M  08/19/2019     Follow-up and Dispositions    · Return in about 4 months (around 11/22/2019) for routine follow up.

## 2019-07-22 NOTE — PROGRESS NOTES
Chief Complaint   Patient presents with   Devika Annual Wellness Visit     Clock Draw Test Done    I have reviewed the patient's medical history in detail and updated the computerized patient record. Health Maintenance reviewed. 1. Have you been to the ER, urgent care clinic since your last visit? Hospitalized since your last visit?no    2. Have you seen or consulted any other health care providers outside of the [address] [place] [address] since your last visit? Include any pap smears or colon screening. No      Encouraged pt to discuss pt's wishes with spouse/partner/family and bring them in the next appt to follow thru with the Advanced Directive    Fall Risk Assessment, last 12 mths 7/22/2019   Able to walk? Yes   Fall in past 12 months? No       3 most recent PHQ Screens 7/22/2019   Little interest or pleasure in doing things Several days   Feeling down, depressed, irritable, or hopeless Several days   Total Score PHQ 2 2       Abuse Screening Questionnaire 2/19/2019   Do you ever feel afraid of your partner? N   Are you in a relationship with someone who physically or mentally threatens you? N   Is it safe for you to go home?  Y       ADL Assessment 2/19/2019   Feeding yourself No Help Needed   Getting from bed to chair No Help Needed   Getting dressed No Help Needed   Bathing or showering No Help Needed   Walk across the room (includes cane/walker) No Help Needed   Using the telphone No Help Needed   Taking your medications No Help Needed   Preparing meals No Help Needed   Managing money (expenses/bills) No Help Needed   Moderately strenuous housework (laundry) No Help Needed   Shopping for personal items (toiletries/medicines) No Help Needed   Shopping for groceries No Help Needed   Driving No Help Needed   Climbing a flight of stairs No Help Needed   Getting to places beyond walking distances No Help Needed

## 2019-07-22 NOTE — PATIENT INSTRUCTIONS
Medicare Wellness Visit, Male    The best way to live healthy is to have a lifestyle where you eat a well-balanced diet, exercise regularly, limit alcohol use, and quit all forms of tobacco/nicotine, if applicable. Regular preventive services are another way to keep healthy. Preventive services (vaccines, screening tests, monitoring & exams) can help personalize your care plan, which helps you manage your own care. Screening tests can find health problems at the earliest stages, when they are easiest to treat. [address] [facility] follows the current, evidence-based guidelines published by the [facility] ([facility]) when recommending preventive services for our patients. Because we follow these guidelines, sometimes recommendations change over time as research supports it. (For example, a prostate screening blood test is no longer routinely recommended for men with no symptoms.)  Of course, you and your doctor may decide to screen more often for some diseases, based on your risk and co-morbidities (chronic disease you are already diagnosed with). Preventive services for you include:  - Medicare offers their members a free annual wellness visit, which is time for you and your primary care provider to discuss and plan for your preventive service needs. Take advantage of this benefit every year!  -All adults over age 72 should receive the recommended pneumonia vaccines. Current USPSTF guidelines recommend a series of two vaccines for the best pneumonia protection.   -All adults should have a flu vaccine yearly and an ECG.  All adults age 61 and older should receive a shingles vaccine once in their lifetime.    -All adults age 38-68 who are overweight should have a diabetes screening test once every three years.   -Other screening tests & preventive services for persons with diabetes include: an eye exam to screen for diabetic retinopathy, a kidney function test, a foot exam, and stricter control over your cholesterol.   -Cardiovascular screening for adults with routine risk involves an electrocardiogram (ECG) at intervals determined by the provider.   -Colorectal cancer screening should be done for adults age 54-65 with no increased risk factors for colorectal cancer. There are a number of acceptable methods of screening for this type of cancer. Each test has its own benefits and drawbacks. Discuss with your provider what is most appropriate for you during your annual wellness visit. The different tests include: colonoscopy (considered the best screening method), a fecal occult blood test, a fecal DNA test, and sigmoidoscopy.  -All adults born between [facility] should be screened once for Hepatitis C.  -An Abdominal Aortic Aneurysm (AAA) Screening is recommended for men age 73-68 who has ever smoked in their lifetime. Here is a list of your current Health Maintenance items (your personalized list of preventive services) with a due date:  Health Maintenance Due   Topic Date Due    Albumin Urine Test  04/18/2019    Cholesterol Test   07/16/2019    Annual Well Visit  07/17/2019    Hemoglobin A1C    08/19/2019     Nutrition Tips for Diabetes: After Your Visit  Your Care Instructions  A healthy diet is important to manage diabetes. It helps you lose weight (if you need to) and keep it off. It gives you the nutrition and energy your body needs and helps prevent heart disease. But a diet for diabetes does not mean that you have to eat special foods. You can eat what your family eats, including occasional sweets and other favorites. But you do have to pay attention to how often you eat and how much you eat of certain foods. The right plan for you will give you meals that help you keep your blood sugar at healthy levels. Try to eat a variety of foods and to spread carbohydrate throughout the day. Carbohydrate raises blood sugar higher and more quickly than any other nutrient does. Carbohydrate is found in sugar, breads and cereals, fruit, starchy vegetables such as potatoes and corn, and milk and yogurt. You may want to work with a dietitian or diabetes educator to help you plan meals and snacks. A dietitian or diabetes educator also can help you lose weight if that is one of your goals. The following tips can help you enjoy your meals and stay healthy. Follow-up care is a key part of your treatment and safety. Be sure to make and go to all appointments, and call your doctor if you are having problems. Its also a good idea to know your test results and keep a list of the medicines you take. How can you care for yourself at home? · Learn which foods have carbohydrate and how much carbohydrate to eat. A dietitian or diabetes educator can help you learn to keep track of how much carbohydrate you eat. · Spread carbohydrate throughout the day. Eat some carbohydrate at all meals, but do not eat too much at any one time. · Plan meals to include food from all the food groups. These are the food groups and some example portion sizes:  ¨ Grains: 1 slice of bread (1 ounce), ½ cup of cooked cereal, and 1/3 cup of cooked pasta or rice. These have about 15 grams of carbohydrate in a serving. Choose whole grains such as whole wheat bread or crackers, oatmeal, and brown rice more often than refined grains. ¨ Fruit: 1 small fresh fruit, such as an apple or orange; ½ of a banana; ½ cup of chopped, cooked, or canned fruit; ½ cup of fruit juice; 1 cup of melon or raspberries; and 2 tablespoons of dried fruit. These have about 15 grams of carbohydrate in a serving. ¨ Dairy: 1 cup of nonfat or low-fat milk and 2/3 cup of plain yogurt. These have about 15 grams of carbohydrate in a serving. ¨ Protein foods: Beef, chicken, turkey, fish, eggs, tofu, cheese, cottage cheese, and peanut butter. A serving size of meat is 3 ounces, which is about the size of a deck of cards.  Examples of meat substitute serving sizes (equal to 1 ounce of meat) are 1/4 cup of cottage cheese, 1 egg, 1 tablespoon of peanut butter, and ½ cup of tofu. These have very little or no carbohydrate per serving. ¨ Vegetables: Starchy vegetables such as ½ cup of cooked dried beans, peas, potatoes, or corn have about 15 grams of carbohydrate. Nonstarchy vegetables have very little carbohydrate, such as 1 cup of raw leafy vegetables (such as spinach), ½ cup of other vegetables (cooked or chopped), and 3/4 cup of vegetable juice. · Use the plate format to plan meals. It is a good, quick way to make sure that you have a balanced meal. It also helps you spread carbohydrate throughout the day. You divide your plate by types of foods. Put vegetables on half the plate, meat or meat substitutes on one-quarter of the plate, and a grain or starchy vegetable (such as brown rice or a potato) in the final quarter of the plate. To this you can add a small piece of fruit and 1 cup of milk or yogurt, depending on how much carbohydrate you are supposed to eat at a meal.  · Talk to your dietitian or diabetes educator about ways to add limited amounts of sweets into your meal plan. You can eat these foods now and then, as long as you include the amount of carbohydrate they have in your daily carbohydrate allowance. · If you drink alcohol, limit it to no more than 1 drink a day for women and 2 drinks a day for men. If you are pregnant, no amount of alcohol is known to be safe. · Protein, fat, and fiber do not raise blood sugar as much as carbohydrate does. If you eat a lot of these nutrients in a meal, your blood sugar will rise more slowly than it would otherwise. · Limit saturated fats, such as those from meat and dairy products. Try to replace it with monounsaturated fat, such as olive oil. This is a healthier choice because people who have diabetes are at higher-than-average risk of heart disease. But use a modest amount of olive oil.  A tablespoon of olive oil has 14 grams of fat and 120 calories. · Exercise lowers blood sugar. If you take insulin by shots or pump, you can use less than you would if you were not exercising. Keep in mind that timing matters. If you exercise within 1 hour after a meal, your body may need less insulin for that meal than it would if you exercised 3 hours after the meal. Test your blood sugar to find out how exercise affects your need for insulin. · Exercise on most days of the week. Aim for at least 30 minutes. Exercise helps you stay at a healthy weight and helps your body use insulin. Walking is an easy way to get exercise. Gradually increase the amount you walk every day. You also may want to swim, bike, or do other activities. When you eat out  · Learn to estimate the serving sizes of foods that have carbohydrate. If you measure food at home, it will be easier to estimate the amount in a serving of restaurant food. · If the meal you order has too much carbohydrate (such as potatoes, corn, or baked beans), ask to have a low-carbohydrate food instead. Ask for a salad or green vegetables. · If you use insulin, check your blood sugar before and after eating out to help you plan how much to eat in the future. · If you eat more carbohydrate at a meal than you had planned, take a walk or do other exercise. This will help lower your blood sugar. Where can you learn more? Go to [employer]  Enter V145 in the search box to learn more about \"Nutrition Tips for Diabetes: After Your Visit. \"   © 9312-0936 [employer], [employer]. Care instructions adapted under license by [facility] (which disclaims liability or warranty for this information).  This care instruction is for use with your licensed healthcare professional. If you have questions about a medical condition or this instruction, always ask your healthcare professional. [place] [address] any warranty or liability for your use of this information.   Content Version: 21.9.384002; Current as of: June 4, 2014

## 2019-11-18 NOTE — PROGRESS NOTES
HISTORY OF PRESENT ILLNESS Juan Diego Gracia is a 66 y.o. male. Knee Pain The history is provided by the patient. This is a recurrent problem. Episode onset: 4 days. The problem occurs daily. The problem has been gradually improving. Pertinent negatives include no chest pain and no shortness of breath. The symptoms are aggravated by bending. Treatments tried: naprosyn. The treatment provided no relief. Dm is controlled with metformin only no hypos, last A1C was 7.0 Ca doc says prostate CA undetectable. Mostly compliant with diet. Patient Active Problem List  
Diagnosis Code  Gout M10.9  Essential hypertension I10  
 [facility] C61  Type 2 diabetes mellitus with hyperglycemia, without long-term current use of insulin (HCC) E11.65  Type 2 diabetes mellitus with nephropathy (HCC) E11.21  
 Severe obesity (BMI 35.0-39. 9) with comorbidity ([facility] E66.01  
 Lymphedema I89.0 Review of Systems Respiratory: Negative for shortness of breath. Cardiovascular: Negative for chest pain. Musculoskeletal: Positive for joint pain. Negative for falls. Neurological: Negative for tingling and focal weakness. Social History Socioeconomic History  Marital status:  Spouse name: Not on file  Number of children: 2  
 Years of education: Not on file  Highest education level: Not on file Occupational History  Occupation: retired cuts grass Social Needs  Financial resource strain: Not on file  Food insecurity:  
  Worry: Not on file Inability: Not on file  Transportation needs:  
  Medical: Not on file Non-medical: Not on file Tobacco Use  Smoking status: Never Smoker  Smokeless tobacco: Former User  Tobacco comment: snuff Substance and Sexual Activity  Alcohol use: No  
  Alcohol/week: 0.0 standard drinks Comment: seldom  Drug use: No  
 Sexual activity: Not Currently Lifestyle  Physical activity: Days per week: Not on file Minutes per session: Not on file  Stress: Not on file Relationships  Social connections:  
  Talks on phone: Not on file Gets together: Not on file Attends Oriental orthodox service: Not on file Active member of club or organization: Not on file Attends meetings of clubs or organizations: Not on file Relationship status: Not on file  Intimate partner violence:  
  Fear of current or ex partner: Not on file Emotionally abused: Not on file Physically abused: Not on file Forced sexual activity: Not on file Other Topics Concern  Not on file Social History Narrative Wife with dementia, lots of issues with money and his and her finances. Wife has hip Fx, passed away,  Physical Exam 
Visit Vitals /68 (BP 1 Location: Left arm, BP Patient Position: Sitting) Pulse 60 Temp 98.4 °F (36.9 °C) (Oral) Resp 16 Ht 5' 6\" (1.676 m) Wt 205 lb (93 kg) SpO2 97% BMI 33.09 kg/m² WD WN male NAD Heart RRR without murmers clicks or rubs Lungs CTA Abdo soft nontender Ext mild edema Bi Left knee mildly swollen and warm Diabetic foot exam was performed. No obvious sores or red lesions. Sensation checked by monofilament exam which was normal.  Dorsalis pedis pulse wasred. ASSESSMENT and PLAN Encounter Diagnoses Name Primary?  Idiopathic gout, unspecified chronicity, unspecified site Yes  Encounter for immunization  Type 2 diabetes mellitus with nephropathy ([facility]  Type 2 diabetes mellitus with hyperglycemia, without long-term current use of insulin ([facility]  Screening cholesterol level  Prostate cancer ([facility] Orders Placed This Encounter  INFLUENZA VACCINE INACTIVATED (IIV), SUBUNIT, ADJUVANTED, IM  
 METABOLIC PANEL, BASIC  
 LIPID PANEL  
 URIC ACID  
 HEMOGLOBIN A1C WITH EAG  
 DISCONTD: lisinopril (PRINIVIL, ZESTRIL) 10 mg tablet  lisinopril (PRINIVIL, ZESTRIL) 10 mg tablet  hydroCHLOROthiazide (HYDRODIURIL) 25 mg tablet  furosemide (LASIX) 20 mg tablet  predniSONE (DELTASONE) 20 mg tablet  colchicine (COLCRYS) 0.6 mg tablet Follow-up and Dispositions · Return in about 3 months (around 2/18/2020) for routine follow up.

## 2019-11-18 NOTE — PROGRESS NOTES
Chief Complaint Patient presents with  Gout  
  gout/pain L/knee I have reviewed the patient's medical history in detail and updated the computerized patient record. Health Maintenance reviewed. 1. Have you been to the ER, urgent care clinic since your last visit? Hospitalized since your last visit?no 2. Have you seen or consulted any other health care providers outside of the [address] [place] [address] [place] since your last visit? Include any pap smears or colon screening. No 
 
 
Encouraged pt to discuss pt's wishes with spouse/partner/family and bring them in the next appt to follow thru with the Advanced Directive Fall Risk Assessment, last 12 mths 11/18/2019 Able to walk? Yes Fall in past 12 months? No  
 
 
3 most recent PHQ Screens 11/18/2019 Little interest or pleasure in doing things Several days Feeling down, depressed, irritable, or hopeless Several days Total Score PHQ 2 2 Abuse Screening Questionnaire 2/19/2019 Do you ever feel afraid of your partner? Sylvain Boot Are you in a relationship with someone who physically or mentally threatens you? Sylvain Boot Is it safe for you to go home? Y  
 
 

## 2019-11-18 NOTE — PATIENT INSTRUCTIONS
Gout: Care Instructions Your Care Instructions Gout is a form of arthritis caused by a buildup of uric acid crystals in a joint. It causes sudden attacks of pain, swelling, redness, and stiffness, usually in one joint, especially the big toe. Gout usually comes on without a cause. But it can be brought on by drinking alcohol (especially beer) or eating seafood and red meat. Taking certain medicines, such as diuretics or aspirin, also can bring on an attack of gout. Taking your medicines as prescribed and following up with your doctor regularly can help you avoid gout attacks in the future. Follow-up care is a key part of your treatment and safety. Be sure to make and go to all appointments, and call your doctor if you are having problems. It's also a good idea to know your test results and keep a list of the medicines you take. How can you care for yourself at home? · If the joint is swollen, put ice or a cold pack on the area for 10 to 20 minutes at a time. Put a thin cloth between the ice and your skin. · Prop up the sore limb on a pillow when you ice it or anytime you sit or lie down during the next 3 days. Try to keep it above the level of your heart. This will help reduce swelling. · Rest sore joints. Avoid activities that put weight or strain on the joints for a few days. Take short rest breaks from your regular activities during the day. · Take your medicines exactly as prescribed. Call your doctor if you think you are having a problem with your medicine. · Take pain medicines exactly as directed. ? If the doctor gave you a prescription medicine for pain, take it as prescribed. ? If you are not taking a prescription pain medicine, ask your doctor if you can take an over-the-counter medicine. · Eat less seafood and red meat. · Check with your doctor before drinking alcohol. · Losing weight, if you are overweight, may help reduce attacks of gout. But do not go on a [employer]. \" Losing a lot of weight in a short amount of time can cause a gout attack. When should you call for help? Call your doctor now or seek immediate medical care if: 
  · You have a fever.  
  · The joint is so painful you cannot use it.  
  · You have sudden, unexplained swelling, redness, warmth, or severe pain in one or more joints.  
 Watch closely for changes in your health, and be sure to contact your doctor if: 
  · You have joint pain.  
  · Your symptoms get worse or are not improving after 2 or 3 days. Where can you learn more? Go to http://parth-sean.info/. Enter T779 in the search box to learn more about \"Gout: Care Instructions. \" Current as of: April 1, 2019 Content Version: 12.2 © 1210-0645 [employer]. Care instructions adapted under license by [employer] (which disclaims liability or warranty for this information). If you have questions about a medical condition or this instruction, always ask your healthcare professional. [address] any warranty or liability for your use of this information.

## 2019-12-12 NOTE — PROGRESS NOTES
Subjective:  
 
Mulugeta Lowe is a 66 y.o. male who presents for follow up of hypertension and Inc Cr. New concerns: took more lasix when leg swelled, Cr inc with routine labs. Otherwise feels well. Current Outpatient Medications Medication Sig Dispense Refill  red yeast rice extract 600 mg cap Take 600 mg by mouth now.  lisinopril (PRINIVIL, ZESTRIL) 10 mg tablet Take 1 Tab by mouth daily. 90 Tab 3  
 hydroCHLOROthiazide (HYDRODIURIL) 25 mg tablet TAKE 1 TABLET BY MOUTH DAILY. 90 Tab 3  furosemide (LASIX) 20 mg tablet As needed for swelling 30 Tab 2  
 colchicine (COLCRYS) 0.6 mg tablet Take 1 Tab by mouth daily. As needed for gout 10 Tab 1  
 metoprolol succinate (TOPROL-XL) 100 mg tablet TAKE 1 TABLET BY MOUTH DAILY FOR HEART & BLOOD PRESSURE 90 Tab 3  
 metFORMIN (GLUCOPHAGE) 500 mg tablet Take 2 Tabs by mouth two (2) times daily (with meals). 360 Tab 3  
 diclofenac (VOLTAREN) 1 % gel Apply  to affected area four (4) times daily. 1 Each 5  
 vardenafil (LEVITRA) 20 mg tablet Take 20 mg by mouth as needed. 2 Tab 5 Allergies Allergen Reactions  Lipitor [Atorvastatin] Itching Diet and Lifestyle: generally follows a low sodium diet, does not rigorously follow a diabetic diet Cardiovascular ROS: taking medications as instructed, side effects noted by patient include swelling in ankles, no TIA's, no chest pain on exertion, no dyspnea on exertion, no swelling of ankles. Review of Systems, additional: 
Pertinent items are noted in HPI. Patient Active Problem List  
 Diagnosis Date Noted  Lymphedema 07/22/2019  Severe obesity (BMI 35.0-39. 9) with comorbidity ([facility] 04/18/2018  Type 2 diabetes mellitus with nephropathy ([facility] 01/16/2018  Type 2 diabetes mellitus with hyperglycemia, without long-term current use of insulin ([facility] [facility] 01/30/2017  Prostate cancer ([facility] [facility] 02/23/2016  Essential hypertension 05/28/2015  Gout 10/23/2013 Social History Tobacco Use  Smoking status: Never Smoker  Smokeless tobacco: Former User  Tobacco comment: snuff Substance Use Topics  Alcohol use: No  
  Alcohol/week: 0.0 standard drinks Comment: seldom Lab Results Component Value Date/Time GFR est non-AA 48 (L) 11/26/2019 09:31 AM  
 GFR est AA 55 (L) 11/26/2019 09:31 AM  
 Creatinine 1.42 (H) 11/26/2019 09:31 AM  
 BUN 31 (H) 11/26/2019 09:31 AM  
 Sodium 141 11/26/2019 09:31 AM  
 Potassium 3.9 11/26/2019 09:31 AM  
 Chloride 97 11/26/2019 09:31 AM  
 CO2 28 11/26/2019 09:31 AM  
 
Lab Results Component Value Date/Time Glucose 163 (H) 11/26/2019 09:31 AM  
 Glucose (POC) 122 (H) 01/07/2015 08:53 AM  
   
 
 
 
Objective:  
 
Physical exam significant for the following: WNL Visit Vitals /63 (BP 1 Location: Left arm, BP Patient Position: Sitting) Pulse (!) 58 Temp 96.1 °F (35.6 °C) (Temporal) Resp 16 Ht 5' 6\" (1.676 m) Wt 211 lb (95.7 kg) SpO2 97% BMI 34.06 kg/m² Appearance: alert, well appearing, and in no distress. General exam: CVS exam BP noted to be well controlled today in office, S1, S2 normal, no gallop, no murmur, chest clear, no JVD, no HSM, mild edema. .  
Assessment/Plan:  
 
hypertension well controlled, stable Hold lasix use elba hose Re check BMP. ICD-10-CM ICD-9-CM 1. Essential hypertension Q66 100.0 METABOLIC PANEL, BASIC METABOLIC PANEL, BASIC  
   CO HANDLG&/OR CONVEY OF SPEC FOR TR OFFICE TO LAB  
   COLLECTION VENOUS BLOOD,VENIPUNCTURE 2. Lymphedema I89.0 457.1 CO HANDLG&/OR CONVEY OF SPEC FOR TR OFFICE TO LAB  
   COLLECTION VENOUS BLOOD,VENIPUNCTURE Orders Placed This Encounter  METABOLIC PANEL, BASIC Standing Status:   Future Standing Expiration Date:   6/13/2020  METABOLIC PANEL, BASIC  CKD REPORT  
 CO HANDLG&/OR CONVEY OF SPEC FOR TR OFFICE TO LAB  COLLECTION VENOUS BLOOD,VENIPUNCTURE  
 red yeast rice extract 600 mg cap Sig: Take 600 mg by mouth now. Follow-up and Dispositions · Return if symptoms worsen or fail to improve.

## 2019-12-12 NOTE — LETTER
12/12/2019 12:19 PM 
 
Mr. Nataliia NUNEZ [phone] Dear Nataliia Thomas: 
 
Please find your most recent results below. Resulted Orders METABOLIC PANEL, BASIC (Collected: 11/26/2019  9:31 AM) Result Value Ref Range Glucose 163 (H) 65 - 99 mg/dL BUN 31 (H) 8 - 27 mg/dL Creatinine 1.42 (H) 0.76 - 1.27 mg/dL GFR est non-AA 48 (L) >59 mL/min/1.73 GFR est AA 55 (L) >59 mL/min/1.73  
 BUN/Creatinine ratio 22 10 - 24 Sodium 141 134 - 144 mmol/L Potassium 3.9 3.5 - 5.2 mmol/L Chloride 97 96 - 106 mmol/L  
 CO2 28 20 - 29 mmol/L Calcium 10.1 8.6 - 10.2 mg/dL Narrative Performed at:  [address]  [id number] : Jackie Bernal MD, Phone:  [phone] LIPID PANEL (Collected: 11/26/2019  9:31 AM) Result Value Ref Range Cholesterol, total 243 (H) 100 - 199 mg/dL Triglyceride 174 (H) 0 - 149 mg/dL HDL Cholesterol 62 >39 mg/dL VLDL, calculated 35 5 - 40 mg/dL LDL, calculated 146 (H) 0 - 99 mg/dL Narrative Performed at:  [address]  [id number] : Jackie Bernal MD, Phone:  [phone] CVD REPORT (Collected: 11/26/2019  9:31 AM) Result Value Ref Range INTERPRETATION Note Comment:  
   Supplemental report is available. PDF IMAGE Not applicable Narrative Performed at:  [address] [place], [place]  [id number] : Rey Ying MD, Phone:  [phone] CKD REPORT (Collected: 11/26/2019  9:31 AM) Result Value Ref Range Interpretation Note Comment:  
   Supplemental report is available. Narrative Performed at:  [address] [place], [place]  [id number] : Rey Ying MD, Phone:  [phone] HEMOGLOBIN A1C WITH EAG (Collected: 11/26/2019  9:31 AM) Result Value Ref Range Hemoglobin A1c 7.2 (H) 4.8 - 5.6 % Comment:  
            Prediabetes: 5.7 - 6.4 Diabetes: >6.4 Glycemic control for adults with diabetes: <7.0 Estimated average glucose 160 mg/dL Narrative Performed at:  [address]  [id number] : Vinay Arauz MD, Phone:  [phone] DIABETES PATIENT EDUCATION (Collected: 11/26/2019  9:31 AM) Result Value Ref Range PDF Image Not applicable Narrative Performed at:  [address] [facility] [address] , [place], [address] [facility]  [id number] : Ezra Garrett MD, Phone:  [phone] URIC ACID (Collected: 11/26/2019  9:31 AM) Result Value Ref Range Uric acid 7.6 3.7 - 8.6 mg/dL Comment:  
              Therapeutic target for gout patients: <6.0 Narrative Performed at:  [address]  [id number] : Vinay Arauz MD, Phone:  [phone] RECOMMENDATIONS: 
Labs repeated Please call me if you have any questions: [phone] Sincerely, Alesia Murillo MD

## 2020-01-01 ENCOUNTER — DOCUMENTATION ONLY (OUTPATIENT)
Dept: INTERNAL MEDICINE CLINIC | Age: 75
End: 2020-01-01

## 2020-01-01 ENCOUNTER — OFFICE VISIT (OUTPATIENT)
Dept: INTERNAL MEDICINE CLINIC | Age: 75
End: 2020-01-01

## 2020-01-01 VITALS
BODY MASS INDEX: 34.23 KG/M2 | HEART RATE: 54 BPM | WEIGHT: 213 LBS | RESPIRATION RATE: 16 BRPM | HEIGHT: 66 IN | OXYGEN SATURATION: 97 % | DIASTOLIC BLOOD PRESSURE: 70 MMHG | TEMPERATURE: 96.5 F | SYSTOLIC BLOOD PRESSURE: 136 MMHG

## 2020-01-01 VITALS
HEIGHT: 66 IN | RESPIRATION RATE: 18 BRPM | SYSTOLIC BLOOD PRESSURE: 148 MMHG | OXYGEN SATURATION: 97 % | TEMPERATURE: 97.6 F | DIASTOLIC BLOOD PRESSURE: 80 MMHG | HEART RATE: 69 BPM | WEIGHT: 215 LBS | BODY MASS INDEX: 34.55 KG/M2

## 2020-01-01 DIAGNOSIS — E11.21 TYPE 2 DIABETES MELLITUS WITH NEPHROPATHY (HCC): ICD-10-CM

## 2020-01-01 DIAGNOSIS — C61 PROSTATE CANCER (HCC): ICD-10-CM

## 2020-01-01 DIAGNOSIS — M10.372 ACUTE GOUT DUE TO RENAL IMPAIRMENT INVOLVING TOE OF LEFT FOOT: Primary | ICD-10-CM

## 2020-01-01 DIAGNOSIS — E11.65 TYPE 2 DIABETES MELLITUS WITH HYPERGLYCEMIA, WITHOUT LONG-TERM CURRENT USE OF INSULIN (HCC): Primary | ICD-10-CM

## 2020-01-01 DIAGNOSIS — M10.00 IDIOPATHIC GOUT, UNSPECIFIED CHRONICITY, UNSPECIFIED SITE: ICD-10-CM

## 2020-01-01 DIAGNOSIS — I10 ESSENTIAL HYPERTENSION: ICD-10-CM

## 2020-01-01 DIAGNOSIS — I89.0 LYMPHEDEMA: ICD-10-CM

## 2020-01-01 DIAGNOSIS — E11.65 TYPE 2 DIABETES MELLITUS WITH HYPERGLYCEMIA, WITHOUT LONG-TERM CURRENT USE OF INSULIN (HCC): ICD-10-CM

## 2020-01-01 LAB
BUN SERPL-MCNC: 18 MG/DL (ref 8–27)
BUN/CREAT SERPL: 14 (ref 10–24)
CALCIUM SERPL-MCNC: 9 MG/DL (ref 8.6–10.2)
CHLORIDE SERPL-SCNC: 103 MMOL/L (ref 96–106)
CO2 SERPL-SCNC: 25 MMOL/L (ref 20–29)
CREAT SERPL-MCNC: 1.29 MG/DL (ref 0.76–1.27)
EST. AVERAGE GLUCOSE BLD GHB EST-MCNC: 157 MG/DL
GLUCOSE SERPL-MCNC: 136 MG/DL (ref 65–99)
HBA1C MFR BLD: 7.1 % (ref 4.8–5.6)
INTERPRETATION: NORMAL
Lab: NORMAL
POTASSIUM SERPL-SCNC: 4 MMOL/L (ref 3.5–5.2)
SODIUM SERPL-SCNC: 145 MMOL/L (ref 134–144)

## 2020-01-01 RX ORDER — HYDROCHLOROTHIAZIDE 25 MG/1
TABLET ORAL
Qty: 90 TAB | Refills: 1 | Status: SHIPPED | OUTPATIENT
Start: 2020-01-01

## 2020-01-01 RX ORDER — PREDNISONE 20 MG/1
40 TABLET ORAL
Qty: 10 TAB | Refills: 0 | Status: SHIPPED | OUTPATIENT
Start: 2020-01-01 | End: 2020-06-21

## 2020-01-01 RX ORDER — METOPROLOL SUCCINATE 100 MG/1
100 TABLET, EXTENDED RELEASE ORAL DAILY
Qty: 90 TAB | Refills: 1 | Status: SHIPPED | OUTPATIENT
Start: 2020-01-01

## 2020-01-01 RX ORDER — COLCHICINE 0.6 MG/1
0.6 TABLET ORAL DAILY
Qty: 10 TAB | Refills: 1 | Status: SHIPPED | OUTPATIENT
Start: 2020-01-01

## 2020-02-18 NOTE — PROGRESS NOTES
Subjective:  
 
Santino Rodriguez is a 66 y.o. male seen for follow-up of diabetes. He has had hypoglycemic attacks. .no Blood sugar control has been OK Lab Results Component Value Date/Time Hemoglobin A1c 7.2 (H) 11/26/2019 09:31 AM  
 Hemoglobin A1c 7.0 (H) 07/22/2019 08:52 AM  
 Hemoglobin A1c 7.3 (H) 02/19/2019 08:18 AM  
 Glucose 126 (H) 12/12/2019 12:12 PM  
 Glucose (POC) 122 (H) 01/07/2015 08:53 AM  
 Microalb/Creat ratio (ug/mg creat.) 133.5 (H) 04/18/2018 08:43 AM  
 LDL, calculated 146 (H) 11/26/2019 09:31 AM  
 Creatinine 1.43 (H) 12/12/2019 12:12 PM  
 
 
He has diabetes and hypertension. Santino Rodriguez has the additional concern of none feels well Diabetic Review of Systems: no polyuria or polydipsia, no chest pain, dyspnea or TIA's, no numbness, tingling or pain in extremities. Allergies Allergen Reactions  Lipitor [Atorvastatin] Itching Diet and Lifestyle: does not rigorously follow a diabetic diet, nonsmoker. Patient Active Problem List  
 Diagnosis Date Noted  Lymphedema 07/22/2019  Severe obesity (BMI 35.0-39. 9) with comorbidity ([facility] [facility] 04/18/2018  Type 2 diabetes mellitus with nephropathy ([facility] [facility] 01/16/2018  Type 2 diabetes mellitus with hyperglycemia, without long-term current use of insulin ([facility] [address] [facility] 01/30/2017  Prostate cancer ([facility] [facility] 02/23/2016  Essential hypertension 05/28/2015  Gout 10/23/2013 Current Outpatient Medications Medication Sig Dispense Refill  red yeast rice extract 600 mg cap Take 600 mg by mouth now.  lisinopril (PRINIVIL, ZESTRIL) 10 mg tablet Take 1 Tab by mouth daily. 90 Tab 3  
 hydroCHLOROthiazide (HYDRODIURIL) 25 mg tablet TAKE 1 TABLET BY MOUTH DAILY. 90 Tab 3  furosemide (LASIX) 20 mg tablet As needed for swelling 30 Tab 2  
 colchicine (COLCRYS) 0.6 mg tablet Take 1 Tab by mouth daily.  As needed for gout 10 Tab 1  
 metoprolol succinate (TOPROL-XL) 100 mg tablet TAKE 1 TABLET BY MOUTH DAILY FOR HEART & BLOOD PRESSURE 90 Tab 3  
 metFORMIN (GLUCOPHAGE) 500 mg tablet Take 2 Tabs by mouth two (2) times daily (with meals). 360 Tab 3  
 diclofenac (VOLTAREN) 1 % gel Apply  to affected area four (4) times daily. 1 Each 5  
 vardenafil (LEVITRA) 20 mg tablet Take 20 mg by mouth as needed. 2 Tab 5 Allergies Allergen Reactions  Lipitor [Atorvastatin] Itching Social History Tobacco Use  Smoking status: Never Smoker  Smokeless tobacco: Former User  Tobacco comment: snuff Substance Use Topics  Alcohol use: No  
  Alcohol/week: 0.0 standard drinks Comment: seldom Review of Systems Pertinent items are noted in HPI. Objective:  
 
Significant for the following:  
 
Visit Vitals /88 Pulse (!) 54 Temp 96.5 °F (35.8 °C) (Temporal) Resp 16 Ht 5' 6\" (1.676 m) Wt 213 lb (96.6 kg) SpO2 97% BMI 34.38 kg/m² Appearance: alert, well appearing, and in no distress. Exam: heart sounds normal rate, regular rhythm, normal S1, S2, no murmurs, rubs, clicks or gallops, chest clear, no hepatosplenomegaly Foot exam: deferred Lab review: orders written for new lab studies as appropriate; see orders. Assessment/Plan:  
 
Follow-up diabetes well controlled, stable. Diabetic issues reviewed with him: diabetic diet discussed in detail, written exchange diet given, all medications, side effects and compliance discussed carefully and glycohemoglobin and other lab monitoring discussed. Chronic Conditions Addressed Today 1. Gout 2. Essential hypertension Relevant Orders METABOLIC PANEL, BASIC 3. Prostate cancer ([facility] [address] Overview Seed implantation 2016 [employer] 4. Type 2 diabetes mellitus with hyperglycemia, without long-term current use of insulin (HCC) - Primary 5. Type 2 diabetes mellitus with nephropathy ([facility] [address] Relevant Orders HEMOGLOBIN A1C WITH EAG 6. Lymphedema Encouraged better dietary compliance

## 2020-02-18 NOTE — PROGRESS NOTES
Chief Complaint Patient presents with  Hypertension  
  follow up I have reviewed the patient's medical history in detail and updated the computerized patient record. Health Maintenance reviewed. 1. Have you been to the ER, urgent care clinic since your last visit? Hospitalized since your last visit?no 2. Have you seen or consulted any other health care providers outside of the [address] [place] [address] [place] since your last visit? Include any pap smears or colon screening. Encouraged pt to discuss pt's wishes with spouse/partner/family and bring them in the next appt to follow thru with the Advanced Directive Fall Risk Assessment, last 12 mths 2/18/2020 Able to walk? Yes Fall in past 12 months? No  
 
 
3 most recent PHQ Screens 2/18/2020 Little interest or pleasure in doing things Several days Feeling down, depressed, irritable, or hopeless Several days Total Score PHQ 2 2 Abuse Screening Questionnaire 2/18/2020 Do you ever feel afraid of your partner? Kourtney Martin Are you in a relationship with someone who physically or mentally threatens you? Kourtney Martin Is it safe for you to go home? Y  
 
 

## 2020-04-28 NOTE — TELEPHONE ENCOUNTER
Requested Prescriptions     Pending Prescriptions Disp Refills    metoprolol succinate (TOPROL-XL) 100 mg tablet 90 Tab 3    hydroCHLOROthiazide (HYDRODIURIL) 25 mg tablet 90 Tab 3     Sig: TAKE 1 TABLET BY MOUTH DAILY. Pt would like a 90 days supply.

## 2020-04-29 NOTE — TELEPHONE ENCOUNTER
Requested Prescriptions     Pending Prescriptions Disp Refills    metoprolol succinate (TOPROL-XL) 100 mg tablet 90 Tab 3    hydroCHLOROthiazide (HYDRODIURIL) 25 mg tablet 90 Tab 3     Sig: TAKE 1 TABLET BY MOUTH DAILY. Last office visit: 2/18/2020  Last filled:   HCTZ 25mg 11/18/2019 #90 X 3 refills                     Metoprolol 100mg 11/2/2019 #90 X 3 refills  No changes  Follow up 6/16/2020

## 2020-06-16 NOTE — PATIENT INSTRUCTIONS
Purine-Restricted Diet: Care Instructions Your Care Instructions Purines are substances that are found in some foods. Your body turns purines into uric acid. High levels of uric acid can cause gout, which is a form of arthritis that causes pain and inflammation in joints. You may be able to help control the amount of uric acid in your body by limiting high-purine foods in your diet. Follow-up care is a key part of your treatment and safety. Be sure to make and go to all appointments, and call your doctor if you are having problems. It's also a good idea to know your test results and keep a list of the medicines you take. How can you care for yourself at home? · Plan your meals and snacks around foods that are low in purines and are safe for you to eat. These foods include: ? Green vegetables and tomatoes. ? Fruits. ? Whole-grain breads, rice, and cereals. ? Eggs, peanut butter, and nuts. ? Low-fat milk, cheese, and other milk products. ? Popcorn. ? Gelatin desserts, chocolate, cocoa, and cakes and sweets, in small amounts. · You can eat certain foods that are medium-high in purines, but eat them only once in a while. These foods include: 
? Legumes, such as dried beans and dried peas. You can have 1 cup cooked legumes each day. ? Asparagus, cauliflower, spinach, mushrooms, and green peas. ? Fish and seafood (other than very high-purine seafood). ? Oatmeal, wheat bran, and wheat germ. · Limit very high-purine foods, including: 
? Organ meats, such as liver, kidneys, sweetbreads, and brains. ? Meats, including key, beef, pork, and lamb. ? Game meats and any other meats in large amounts. ? Anchovies, sardines, herring, mackerel, and scallops. ? Gravy. ? Beer. Where can you learn more? Go to http://parth-sean.info/ Enter F448 in the search box to learn more about \"Purine-Restricted Diet: Care Instructions. \" 
 Current as of: August 22, 2019               Content Version: 12.5 © 9056-0166 [employer], Incorporated. Care instructions adapted under license by [employer] (which disclaims liability or warranty for this information). If you have questions about a medical condition or this instruction, always ask your healthcare professional. [address] any warranty or liability for your use of this information.

## 2020-06-16 NOTE — PROGRESS NOTES
HISTORY OF PRESENT ILLNESS Maryse Alfaro is a 66 y.o. male. Foot Swelling The history is provided by the patient. This is a recurrent problem. Episode onset: 3 days ago. The problem occurs hourly. The problem has been gradually improving. The pain is present in the left foot. The quality of the pain is described as aching. Says he has been eating a lot of sardines which she did not know are not on the gout diet which had given to him previously. Diabetes has been well controlled in general with no hypoglycemia. Lab Results Component Value Date/Time Hemoglobin A1c 7.1 (H) 03/05/2020 08:47 AM  
 Hemoglobin A1c 7.2 (H) 11/26/2019 09:31 AM  
 Hemoglobin A1c 7.0 (H) 07/22/2019 08:52 AM  
 Glucose 136 (H) 03/05/2020 08:47 AM  
 Glucose (POC) 122 (H) 01/07/2015 08:53 AM  
 Microalb/Creat ratio (ug/mg creat.) 133.5 (H) 04/18/2018 08:43 AM  
 LDL, calculated 146 (H) 11/26/2019 09:31 AM  
 Creatinine 1.29 (H) 03/05/2020 08:47 AM  
 
 
Patient Active Problem List  
Diagnosis Code  Gout M10.9  Essential hypertension I10  
 Prostate cancer ([facility] C61  Type 2 diabetes mellitus with hyperglycemia, without long-term current use of insulin (HCC) E11.65  Type 2 diabetes mellitus with nephropathy (HCC) E11.21  
 Severe obesity (BMI 35.0-39. 9) with comorbidity ([facility] [address] E66.01  
 Lymphedema I89.0 Review of Systems Constitutional: Negative for fever. Respiratory: Negative for cough and shortness of breath. Cardiovascular: Negative for chest pain. Physical Exam 
Visit Vitals /80 Pulse 69 Temp 97.6 °F (36.4 °C) (Oral) Resp 18 Ht 5' 6\" (1.676 m) Wt 215 lb (97.5 kg) SpO2 97% BMI 34.70 kg/m² WD WN male NAD Heart RRR without murmers clicks or rubs Lungs CTA Abdo soft nontender Ext no edema Left foot quite swollen dorsum of foot russell, neurovascularly intact no ulcers or sores. ASSESSMENT and PLAN Encounter Diagnoses Name Primary?  Acute gout due to renal impairment involving toe of left foot Yes  Type 2 diabetes mellitus with hyperglycemia, without long-term current use of insulin ([address]  Type 2 diabetes mellitus with nephropathy ([address]  Essential hypertension Orders Placed This Encounter  METABOLIC PANEL, BASIC  
 HEMOGLOBIN A1C WITH EAG  
 URIC ACID  predniSONE (DELTASONE) 20 mg tablet  colchicine (Colcrys) 0.6 mg tablet Discussed gout diet See patient instructions, went over them personally with the patient. Emphasized compliance. Questions answered. Patient states that they understand the plan of action and will call if there are any issues or misunderstandings. Discussed possible side affects, precautions, and drug interactions and possible benefits of the medication(s). Diabetes and hypertension are stable routine labs to evaluate prior to the next visit. Follow-up and Dispositions · Return in about 6 weeks (around 7/28/2020) for medicare annual.

## 2020-06-16 NOTE — PROGRESS NOTES
Chief Complaint Patient presents with  Foot Swelling L/foot and L/Great Toe red, swollen and painful Health Maintenance reviewed. I have reviewed the patient's medical history in detail and updated the computerized patient record. Patient has not been out of the country in (3-4 months) 90 -120 days, NO diarrhea, NO cough, NO chest conjestion, NO temp. Pt has not been around anyone with these symptoms. 1. Have you been to the ER, urgent care clinic since your last visit? Hospitalized since your last visit?no 2. Have you seen or consulted any other health care providers outside of the [address] [place] [address] since your last visit? Include any pap smears or colon screening. No 
 
Encouraged pt to discuss pt's wishes with spouse/partner/family and bring them in the next appt to follow thru with the Advanced Directive Fall Risk Assessment, last 12 mths 6/16/2020 Able to walk? Yes Fall in past 12 months? No  
 
 
3 most recent PHQ Screens 6/16/2020 Little interest or pleasure in doing things Several days Feeling down, depressed, irritable, or hopeless Several days Total Score PHQ 2 2 Abuse Screening Questionnaire 2/18/2020 Do you ever feel afraid of your partner? Sol Erica Are you in a relationship with someone who physically or mentally threatens you? Sol Erica Is it safe for you to go home? Y  
 
 

## 2020-07-10 ENCOUNTER — TELEPHONE (OUTPATIENT)
Dept: INTERNAL MEDICINE CLINIC | Age: 75
End: 2020-07-10

## 2020-07-10 NOTE — TELEPHONE ENCOUNTER
----- Message from Deshawn Coon sent at 7/10/2020  3:41 PM EDT -----  Regarding: /Telephone  General Message/Vendor Calls    Caller's first and last name: Bill Maria       Reason for call:pass away       Callback required yes/no and why:Yes      Best contact number(s):[phone]      Details to clarify the request: [place] requesting a call back regarding speaking with the Dr due to pt pass away on 06/20/2020.       Annabelle Montalvo

## 2020-07-10 NOTE — TELEPHONE ENCOUNTER
Discussed with daughter aspects of his death, found in bed passed away. Was not having any Sx prior to him passing away. My guess was myocardial infarction

## 2020-07-10 NOTE — TELEPHONE ENCOUNTER
Returned call to pt's daughter, [place], she needs closure about her father's death. They say he had a massive heart attack and needs closure.   Wants to talk to you.eva

## 2024-11-25 NOTE — MR AVS SNAPSHOT
[address] [address] [facility] 
[phone] Patient: Minerva Guy MRN: [id number] :1945 Visit Information Date & Time Provider Department Dept. Phone Encounter #  
 2018  8:45 AM MD Lexis Kwon Dr [id number] Follow-up Instructions Return in about 3 months (around 2018) for routine follow up. Your Appointments 2018  8:15 AM  
PHYSICAL PRE OP with MD Miles Kwon ([facility]) Appt Note: mwv $0 cp ls 18  
 [address] [place] [address]. [address] [place] [address]  
[phone]  
  
   
 [address] [place] [address] [address] Upcoming Health Maintenance Date Due  
 LIPID PANEL Q1 6/15/2018 MEDICARE YEARLY EXAM 2018 Influenza Age 5 to Adult 2018 HEMOGLOBIN A1C Q6M 10/18/2018 FOOT EXAM Q1 2019 MICROALBUMIN Q1 2019 EYE EXAM RETINAL OR DILATED Q1 2019 GLAUCOMA SCREENING Q2Y 2020 COLONOSCOPY 2025 DTaP/Tdap/Td series (2 - Td) 6/15/2027 Allergies as of 2018  Review Complete On: 2018 By: Jessica Cortes MD  
  
 Severity Noted Reaction Type Reactions Lipitor [Atorvastatin]  10/23/2013    Itching Current Immunizations  Reviewed on 2018 Name Date Influenza High Dose Vaccine PF 10/16/2017, 2016 Influenza Vaccine 10/23/2013 Influenza Vaccine Akil Kappa) 2014 Influenza Vaccine (Quad) PF 10/28/2015 Pneumococcal Conjugate (PCV-13) 2016 Pneumococcal Polysaccharide (PPSV-23) 2014 Tdap 6/15/2017 Not reviewed this visit You Were Diagnosed With   
  
 Codes Comments Kidney stone    -  Primary ICD-10-CM: N20.0 ICD-9-CM: 592.0   
 CRI (chronic renal insufficiency), stage 3 (moderate)     ICD-10-CM: N18.3 ICD-9-CM: 585.3 Rx Refill Request Telephone Encounter  warfarin (Coumadin) 3 mg tablet    Pharmacy:   [facility]    NOV:  12/9/24   Kidney calculus     ICD-10-CM: N20.0 ICD-9-CM: 592.0 Type 2 diabetes mellitus with hyperglycemia, without long-term current use of insulin (HCC)     ICD-10-CM: E11.65 ICD-9-CM: 250.00, 790.29 Vitals BP Pulse Temp Resp Height(growth percentile) Weight(growth percentile) 134/71 (BP 1 Location: Left arm, BP Patient Position: Sitting) (!) 56 97.3 °F (36.3 °C) (Oral) 20 5' 6\" (1.676 m) 226 lb 9.6 oz (102.8 kg) SpO2 BMI Smoking Status 100% 36.57 kg/m2 Never Smoker Vitals History BMI and BSA Data Body Mass Index Body Surface Area  
 36.57 kg/m 2 2.19 m 2 Preferred Pharmacy Pharmacy Name [facility] [address] [phone] Your Updated Medication List  
  
   
This list is accurate as of 6/14/18  9:23 AM.  Always use your most recent med list.  
  
  
  
  
 ACCU-CHEK PHYLLIS PLUS TEST STRP strip Generic drug:  glucose blood VI test strips  
  
 captopril 25 mg tablet Commonly known as:  CAPOTEN  
TAKE 1 TABLET BY MOUTH TWO (2) TIMES A DAY FOR BLOOD PRESSURE  
  
 dexamethasone 0.1 % ophthalmic solution Commonly known as:  DECADRON  
  
 hydroCHLOROthiazide 25 mg tablet Commonly known as:  HYDRODIURIL Take 1 Tab by mouth daily. metFORMIN 500 mg tablet Commonly known as:  GLUCOPHAGE Take 1 Tab by mouth three (3) times daily (with meals). metoprolol succinate 100 mg tablet Commonly known as:  TOPROL-XL  
TAKE 1 TABLET BY MOUTH DAILY FOR HEART & BLOOD PRESSURE  
  
 naproxen 500 mg tablet Commonly known as:  NAPROSYN  
TAKE 1 TABLET BY MOUTH TWO (2) TIMES DAILY (WITH MEALS). AS NEEDED FOR GOUT  
  
 ofloxacin 0.3 % ophthalmic solution Commonly known as:  FLOXIN We Performed the Following AMB POC URINALYSIS DIP STICK AUTO W/O MICRO [38699 CPT(R)] METABOLIC PANEL, BASIC [22346 CPT(R)] Follow-up Instructions Return in about 3 months (around 9/14/2018) for routine follow up. Introducing [address]! Anamika Gomez introduces [employer] patient portal. Now you can access parts of your medical record, email your doctor's office, and request medication refills online. 1. In your internet browser, go to [email or web address] 2. Click on the First Time User? Click Here link in the Sign In box. You will see the New Member Sign Up page. 3. Enter your [employer] Access Code exactly as it appears below. You will not need to use this code after youve completed the sign-up process. If you do not sign up before the expiration date, you must request a new code. · [employer] Access Code: [facility] Expires: 7/17/2018  7:51 AM 
 
4. Enter the last four digits of your Social Security Number (xxxx) and Date of Birth (mm/dd/yyyy) as indicated and click Submit. You will be taken to the next sign-up page. 5. Create a [employer] ID. This will be your [employer] login ID and cannot be changed, so think of one that is secure and easy to remember. 6. Create a [employer] password. You can change your password at any time. 7. Enter your Password Reset Question and Answer. This can be used at a later time if you forget your password. 8. Enter your e-mail address. You will receive e-mail notification when new information is available in [address] 9. Click Sign Up. You can now view and download portions of your medical record. 10. Click the Download Summary menu link to download a portable copy of your medical information. If you have questions, please visit the Frequently Asked Questions section of the [employer] website. Remember, [employer] is NOT to be used for urgent needs. For medical emergencies, dial 911. Now available from your iPhone and Android! Please provide this summary of care documentation to your next provider. Your primary care clinician is listed as Parminder Sanders.  If you have any questions after today's visit, please call [phone].

## 2025-03-02 NOTE — PROGRESS NOTES
Subjective:  
 
Nicole Springer is a 74 y.o. male seen for follow-up of diabetes. He has had hypoglycemic attacks. .no Blood sugar control has been good He has diabetes, hypertension and hyperlipidemia. Prostate CA in remission, no further kidney stones. Nicole Springer has the additional concern of feels well, no c/o Diabetic Review of Systems: no polyuria or polydipsia, no chest pain, dyspnea or TIA's, no numbness, tingling or pain in extremities. Allergies Allergen Reactions  Lipitor [Atorvastatin] Itching Diet and Lifestyle: follows a diabetic diet regularly, nonsmoker. Patient Active Problem List  
 Diagnosis Date Noted  Severe obesity (BMI 35.0-39. 9) with comorbidity ([facility] 04/18/2018  Type 2 diabetes mellitus with nephropathy ([facility] 01/16/2018  Type 2 diabetes mellitus with hyperglycemia, without long-term current use of insulin ([facility] 01/30/2017  Prostate cancer ([facility] 02/23/2016  Essential hypertension 05/28/2015  Gout 10/23/2013 Social History Tobacco Use  Smoking status: Never Smoker  Smokeless tobacco: Former User  Tobacco comment: snuff Substance Use Topics  Alcohol use: No  
  Alcohol/week: 0.0 oz  
  Comment: seldom Lab Results Component Value Date/Time WBC 8.7 05/30/2018 08:55 AM  
 HGB 12.0 (L) 05/30/2018 08:55 AM  
 HCT 37.7 05/30/2018 08:55 AM  
 PLATELET 673 87/05/3444 08:55 AM  
 MCV 82.7 05/30/2018 08:55 AM  
 
Lab Results Component Value Date/Time Hemoglobin A1c 7.4 (H) 07/16/2018 08:40 AM  
 Hemoglobin A1c 9.1 (H) 04/18/2018 08:43 AM  
 Hemoglobin A1c 7.7 (H) 01/16/2018 08:25 AM  
 Glucose 117 (H) 07/16/2018 08:40 AM  
 Glucose (POC) 122 (H) 01/07/2015 08:53 AM  
 Microalb/Creat ratio (ug/mg creat.) 133.5 (H) 04/18/2018 08:43 AM  
 LDL, calculated 124 (H) 07/16/2018 08:40 AM  
 Creatinine 1.38 (H) 07/16/2018 08:40 AM  
  
Lab Results Component Value Date/Time Cholesterol, total 184 07/16/2018 08:40 AM  
 HDL Cholesterol 44 07/16/2018 08:40 AM  
 LDL, calculated 124 (H) 07/16/2018 08:40 AM  
 Triglyceride 78 07/16/2018 08:40 AM  
 
Lab Results Component Value Date/Time GFR est non-AA 50 (L) 07/16/2018 08:40 AM  
 GFR est AA 58 (L) 07/16/2018 08:40 AM  
 Creatinine 1.38 (H) 07/16/2018 08:40 AM  
 BUN 22 07/16/2018 08:40 AM  
 Sodium 140 07/16/2018 08:40 AM  
 Potassium 3.9 07/16/2018 08:40 AM  
 Chloride 99 07/16/2018 08:40 AM  
 CO2 23 07/16/2018 08:40 AM  
 
Lab Results Component Value Date/Time Glucose 117 (H) 07/16/2018 08:40 AM  
 Glucose (POC) 122 (H) 01/07/2015 08:53 AM  
   
 
Review of Systems Pertinent items are noted in HPI. Objective:  
 
Significant for the following:  
 
Visit Vitals /80 (BP 1 Location: Left arm, BP Patient Position: Sitting) Pulse (!) 55 Temp 96.3 °F (35.7 °C) (Oral) Resp 16 Ht 5' 6\" (1.676 m) Wt 213 lb (96.6 kg) SpO2 98% BMI 34.38 kg/m² Appearance: alert, well appearing, and in no distress. Exam: heart sounds normal rate, regular rhythm, normal S1, S2, no murmurs, rubs, clicks or gallops, chest clear, sl edema Foot exam:  
Diabetic foot exam was performed. No obvious sores or red lesions. Sensation checked by monofilament exam which was normal.  Dorsalis pedis pulse waspresent. Nails trimmed Lab review: orders written for new lab studies as appropriate; see orders. Assessment/Plan:  
 
Follow-up diabetes stable. Diabetic issues reviewed with him: all medications, side effects and compliance discussed carefully. ICD-10-CM ICD-9-CM 1. Type 2 diabetes mellitus with hyperglycemia, without long-term current use of insulin (HCC) E11.65 250.00 AMB POC HEMOGLOBIN A1C  
  790.29 2. Lymphedema I89.0 457.1 3. Essential hypertension I10 401.9 4. Kidney stone N20.0 592.0 5. Prostate cancer ([address] C61 185 Orders Placed This Encounter  AMB POC HEMOGLOBIN A1C  
 
=7.9, Dm stable Orders Placed This Encounter  AMB POC HEMOGLOBIN A1C  
 hydroCHLOROthiazide (HYDRODIURIL) 25 mg tablet Sig: Take 1 Tab by mouth daily. Dispense:  90 Tab Refill:  3  captopril (CAPOTEN) 25 mg tablet Sig: TAKE 1 TABLET BY MOUTH TWO (2) TIMES A DAY FOR BLOOD PRESSURE Dispense:  180 Tab Refill:  3 Follow-up Disposition: 
Return in about 3 months (around 2/16/2019) for routine follow up. 7 (severe pain)